# Patient Record
Sex: FEMALE | Race: WHITE | NOT HISPANIC OR LATINO | Employment: OTHER | URBAN - NONMETROPOLITAN AREA
[De-identification: names, ages, dates, MRNs, and addresses within clinical notes are randomized per-mention and may not be internally consistent; named-entity substitution may affect disease eponyms.]

---

## 2018-09-13 ENCOUNTER — OFFICE VISIT (OUTPATIENT)
Dept: FAMILY MEDICINE | Facility: OTHER | Age: 73
End: 2018-09-13
Attending: NURSE PRACTITIONER
Payer: MEDICARE

## 2018-09-13 VITALS
SYSTOLIC BLOOD PRESSURE: 142 MMHG | BODY MASS INDEX: 28.89 KG/M2 | TEMPERATURE: 96.5 F | HEART RATE: 84 BPM | WEIGHT: 169.25 LBS | DIASTOLIC BLOOD PRESSURE: 84 MMHG | HEIGHT: 64 IN

## 2018-09-13 DIAGNOSIS — R39.89 URINARY PROBLEM: ICD-10-CM

## 2018-09-13 DIAGNOSIS — N39.0 ACUTE URINARY TRACT INFECTION: Primary | ICD-10-CM

## 2018-09-13 LAB
ALBUMIN UR-MCNC: NEGATIVE MG/DL
AMORPH CRY #/AREA URNS HPF: ABNORMAL /HPF
ANION GAP SERPL CALCULATED.3IONS-SCNC: 6 MMOL/L (ref 3–14)
APPEARANCE UR: ABNORMAL
BACTERIA #/AREA URNS HPF: ABNORMAL /HPF
BASOPHILS # BLD AUTO: 0.1 10E9/L (ref 0–0.2)
BASOPHILS NFR BLD AUTO: 0.7 %
BILIRUB UR QL STRIP: NEGATIVE
BUN SERPL-MCNC: 15 MG/DL (ref 7–25)
CALCIUM SERPL-MCNC: 10 MG/DL (ref 8.6–10.3)
CHLORIDE SERPL-SCNC: 107 MMOL/L (ref 98–107)
CO2 SERPL-SCNC: 28 MMOL/L (ref 21–31)
COLOR UR AUTO: YELLOW
CREAT SERPL-MCNC: 1.03 MG/DL (ref 0.6–1.2)
DIFFERENTIAL METHOD BLD: ABNORMAL
EOSINOPHIL # BLD AUTO: 0.2 10E9/L (ref 0–0.7)
EOSINOPHIL NFR BLD AUTO: 3 %
ERYTHROCYTE [DISTWIDTH] IN BLOOD BY AUTOMATED COUNT: 12.7 % (ref 10–15)
GFR SERPL CREATININE-BSD FRML MDRD: 52 ML/MIN/1.7M2
GLUCOSE SERPL-MCNC: 107 MG/DL (ref 70–105)
GLUCOSE UR STRIP-MCNC: NEGATIVE MG/DL
HCT VFR BLD AUTO: 46.1 % (ref 35–47)
HGB BLD-MCNC: 16.3 G/DL (ref 11.7–15.7)
HGB UR QL STRIP: ABNORMAL
IMM GRANULOCYTES # BLD: 0 10E9/L (ref 0–0.4)
IMM GRANULOCYTES NFR BLD: 0.4 %
KETONES UR STRIP-MCNC: NEGATIVE MG/DL
LEUKOCYTE ESTERASE UR QL STRIP: ABNORMAL
LYMPHOCYTES # BLD AUTO: 2.2 10E9/L (ref 0.8–5.3)
LYMPHOCYTES NFR BLD AUTO: 29.3 %
MCH RBC QN AUTO: 31.3 PG (ref 26.5–33)
MCHC RBC AUTO-ENTMCNC: 35.4 G/DL (ref 31.5–36.5)
MCV RBC AUTO: 89 FL (ref 78–100)
MONOCYTES # BLD AUTO: 0.5 10E9/L (ref 0–1.3)
MONOCYTES NFR BLD AUTO: 6.1 %
NEUTROPHILS # BLD AUTO: 4.5 10E9/L (ref 1.6–8.3)
NEUTROPHILS NFR BLD AUTO: 60.5 %
NITRATE UR QL: NEGATIVE
NON-SQ EPI CELLS #/AREA URNS LPF: ABNORMAL /LPF
PH UR STRIP: 8 PH (ref 5–9)
PLATELET # BLD AUTO: 259 10E9/L (ref 150–450)
POTASSIUM SERPL-SCNC: 3.9 MMOL/L (ref 3.5–5.1)
RBC # BLD AUTO: 5.21 10E12/L (ref 3.8–5.2)
RBC #/AREA URNS AUTO: ABNORMAL /HPF
SODIUM SERPL-SCNC: 141 MMOL/L (ref 134–144)
SOURCE: ABNORMAL
SP GR UR STRIP: <1.005 (ref 1–1.03)
UROBILINOGEN UR STRIP-ACNC: 0.2 EU/DL (ref 0.2–1)
WBC # BLD AUTO: 7.4 10E9/L (ref 4–11)
WBC #/AREA URNS AUTO: ABNORMAL /HPF

## 2018-09-13 PROCEDURE — 99203 OFFICE O/P NEW LOW 30 MIN: CPT | Performed by: NURSE PRACTITIONER

## 2018-09-13 PROCEDURE — 87086 URINE CULTURE/COLONY COUNT: CPT | Performed by: NURSE PRACTITIONER

## 2018-09-13 PROCEDURE — 85025 COMPLETE CBC W/AUTO DIFF WBC: CPT | Performed by: NURSE PRACTITIONER

## 2018-09-13 PROCEDURE — G0463 HOSPITAL OUTPT CLINIC VISIT: HCPCS

## 2018-09-13 PROCEDURE — 81001 URINALYSIS AUTO W/SCOPE: CPT | Performed by: NURSE PRACTITIONER

## 2018-09-13 PROCEDURE — 80048 BASIC METABOLIC PNL TOTAL CA: CPT | Performed by: NURSE PRACTITIONER

## 2018-09-13 PROCEDURE — 36415 COLL VENOUS BLD VENIPUNCTURE: CPT | Performed by: NURSE PRACTITIONER

## 2018-09-13 PROCEDURE — 87077 CULTURE AEROBIC IDENTIFY: CPT | Performed by: NURSE PRACTITIONER

## 2018-09-13 RX ORDER — CIPROFLOXACIN 250 MG/1
250 TABLET, FILM COATED ORAL 2 TIMES DAILY
Qty: 10 TABLET | Refills: 0 | Status: SHIPPED | OUTPATIENT
Start: 2018-09-13 | End: 2018-09-18

## 2018-09-13 NOTE — NURSING NOTE
Patient presents to the clinic for dark colored urine, urinary urgency and urinary frequency that started yesterday.  Ruth CORDOVA CMA...9/13/2018 10:46 AM

## 2018-09-13 NOTE — PATIENT INSTRUCTIONS
Additive to medications - Stearate     Urine indicative of UTI    Urine culture pending    Blood work did not indicate a kidney infection    Ciprofloxacin twice daily x 5 days    Follow up if persisting, worsening or concerns

## 2018-09-13 NOTE — PROGRESS NOTES
"HPI:    Anna Neal is a 73 year old female  who presents to clinic today for UTI symptoms.    Dark brown urine, frequency, urgency, decreased output, pressure and dysuria.   States symptoms started during the night.  Mid back pain yesterday, not as bad today.  No fevers or chills.  No nausea or vomiting.  Appetite decreased some yesterday and today.  Generalized abdominal discomfort.  Increased \"stomach noise\".  States history of constipation when traveling, currently feeling constipated, last bowel movement a few days ago.  No change in stool appearance, no blood in stool,  no diarrhea.    No vaginal itching, discharge, or bleeding.  Uses Estradiol vaginal cream twice weekly.    States previous hospitalization for pyelonephritis about 6 months ago.    Used heat to back last night which helped.    Patient states she is not currently taking any prescription medications other than Estradiol cream.      Past Medical History:   Diagnosis Date     Arthritis      Cancer (H)     basil and squamous skin cancer.  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      Restless leg syndrome      Past Surgical History:   Procedure Laterality Date     APPENDECTOMY       BREAST SURGERY      tumor right breast removed     GYN SURGERY      hysterectomy      ORTHOPEDIC SURGERY      arthroscopy bilateral knees     SOFT TISSUE SURGERY Left     left arm sarcoma removed     Social History   Substance Use Topics     Smoking status: Light Tobacco Smoker     Types: Cigars     Smokeless tobacco: Never Used     Alcohol use No     No current outpatient prescriptions on file.     Allergies   Allergen Reactions     Aspirin Hives     Bee Venom Hives and Itching     Mesalamine Rash     Nsaids Anaphylaxis and Hives     Nuts Shortness Of Breath and Swelling     Tree nuts      Atenolol Other (See Comments)     Fatigue;  12-10;     Cetirizine Other (See Comments)     Erythromycin GI Disturbance     GI Sx     Mometasone Unknown     epistaxis     Nortriptyline Unknown     " "Paroxetine Other (See Comments)     tired     Penicillins Unknown     unknown     Sulfamethoxazole-Trimethoprim Hives     Trazodone Other (See Comments)     VERY TIRED  RLS;  JUMPED OUT OF MY SKIN;     Estradiol Itching and Rash     Latex Rash     unknown         Past medical history, past surgical history, current medications and allergies reviewed and accurate to the best of my knowledge.        ROS:  Refer to HPI    /84 (BP Location: Right arm, Patient Position: Sitting, Cuff Size: Adult Regular)  Pulse 84  Temp 96.5  F (35.8  C) (Tympanic)  Ht 5' 4\" (1.626 m)  Wt 169 lb 4 oz (76.8 kg)  BMI 29.05 kg/m2    EXAM:  General Appearance: Well appearing elderly female, non ill appearance, appropriate appearance for age. No acute distress  Respiratory: normal chest wall and respirations.  Normal effort.  Clear to auscultation bilaterally, no wheezing, crackles or rhonchi.  No increased work of breathing.  No cough appreciated.   Cardiac: RRR with no murmurs  Abdomen: soft, mild generalized tenderness over mid abdomen and lower abdomen, no masses or organomegally, no rebound tenderness or guarding, no rigidity, normal bowel sounds present  :  No suprapubic tenderness to palpation.  No CVA tenderness to palpation.    Musculoskeletal:  Normal gait.  Equal movement of bilateral upper extremities.  Equal movement of bilateral lower extremities.    Psychological: normal affect, alert and pleasant      Labs:  Results for orders placed or performed in visit on 09/13/18   Urinalysis w Reflex Microscopic If Positive   Result Value Ref Range    Color Urine Yellow     Appearance Urine Turbid     Glucose Urine Negative NEG^Negative mg/dL    Bilirubin Urine Negative NEG^Negative    Ketones Urine Negative NEG^Negative mg/dL    Specific Gravity Urine <1.005 1.000 - 1.030    Blood Urine Trace (A) NEG^Negative    pH Urine 8.0 5.0 - 9.0 pH    Protein Albumin Urine Negative NEG^Negative mg/dL    Urobilinogen Urine 0.2 0.2 - 1.0 " EU/dL    Nitrite Urine Negative NEG^Negative    Leukocyte Esterase Urine Small (A) NEG^Negative    Source Urine    Urine Microscopic   Result Value Ref Range    WBC Urine 25-50 (A) OTO5^0 - 5 /HPF    RBC Urine 5-10 (A) OTO2^O - 2 /HPF    Squamous Epithelial /LPF Urine Few FEW^Few /LPF    Bacteria Urine Many (A) NEG^Negative /HPF    Amorphous Crystals Few (A) NEG^Negative /HPF   CBC and Differential   Result Value Ref Range    WBC 7.4 4.0 - 11.0 10e9/L    RBC Count 5.21 (H) 3.8 - 5.2 10e12/L    Hemoglobin 16.3 (H) 11.7 - 15.7 g/dL    Hematocrit 46.1 35.0 - 47.0 %    MCV 89 78 - 100 fl    MCH 31.3 26.5 - 33.0 pg    MCHC 35.4 31.5 - 36.5 g/dL    RDW 12.7 10.0 - 15.0 %    Platelet Count 259 150 - 450 10e9/L    Diff Method Automated Method     % Neutrophils 60.5 %    % Lymphocytes 29.3 %    % Monocytes 6.1 %    % Eosinophils 3.0 %    % Basophils 0.7 %    % Immature Granulocytes 0.4 %    Absolute Neutrophil 4.5 1.6 - 8.3 10e9/L    Absolute Lymphocytes 2.2 0.8 - 5.3 10e9/L    Absolute Monocytes 0.5 0.0 - 1.3 10e9/L    Absolute Eosinophils 0.2 0.0 - 0.7 10e9/L    Absolute Basophils 0.1 0.0 - 0.2 10e9/L    Abs Immature Granulocytes 0.0 0 - 0.4 10e9/L   Basic Metabolic Panel   Result Value Ref Range    Sodium 141 134 - 144 mmol/L    Potassium 3.9 3.5 - 5.1 mmol/L    Chloride 107 98 - 107 mmol/L    Carbon Dioxide 28 21 - 31 mmol/L    Anion Gap 6 3 - 14 mmol/L    Glucose 107 (H) 70 - 105 mg/dL    Urea Nitrogen 15 7 - 25 mg/dL    Creatinine 1.03 0.60 - 1.20 mg/dL    GFR Estimate 52 (L) >60 mL/min/1.7m2    GFR Estimate If Black 63 >60 mL/min/1.7m2    Calcium 10.0 8.6 - 10.3 mg/dL             ASSESSMENT/PLAN:  1. Urinary problem    - Urinalysis w Reflex Microscopic If Positive  - Urine Microscopic  - CBC and Differential; Future  - Basic Metabolic Panel; Future  - CBC and Differential  - Basic Metabolic Panel  - Urine Culture Aerobic Bacterial    2. Acute urinary tract infection      Urinalysis - moderate WBCs, few RBCs, many  bacteria, negative nitrite    Urine culture pending    CBC - normal     BMP - no concerns, normal creatinine, minimally decreased GFR of 52    - ciprofloxacin (CIPRO) 250 MG tablet; Take 1 tablet (250 mg) by mouth 2 times daily for 5 days  Dispense: 10 tablet; Refill: 0  Unable to use Bactrim due to allergy.  Unable to use Macrobid due to low GFR.      Encouraged fluids and frequent bladder emptying.    May use Pyridium OTC PRN.      Will call if culture warrants change of abx.     Discussed warning signs/symptoms indicative of need to f/u    Follow up if symptoms persist or worsen or concerns

## 2018-09-13 NOTE — MR AVS SNAPSHOT
"              After Visit Summary   9/13/2018    Anna Neal    MRN: [id number]           Patient Information     Date Of Birth          1945        Visit Information        Provider Department      9/13/2018 11:00 AM Marcela Cash NP [facility]        Today's Diagnoses     Urinary problem    -  1    Acute urinary tract infection          Care Instructions    Additive to medications - Stearate     Urine indicative of UTI    Urine culture pending    Blood work did not indicate a kidney infection    Ciprofloxacin twice daily x 5 days    Follow up if persisting, worsening or concerns          Follow-ups after your visit        Who to contact     If you have questions or need follow up information about today's clinic visit or your schedule please contact [facility] directly at [phone].  Normal or non-critical lab and imaging results will be communicated to you by BriefMehart, letter or phone within 4 business days after the clinic has received the results. If you do not hear from us within 7 days, please contact the clinic through BriefMehart or phone. If you have a critical or abnormal lab result, we will notify you by phone as soon as possible.  Submit refill requests through Cheezburger or call your pharmacy and they will forward the refill request to us. Please allow 3 business days for your refill to be completed.          Additional Information About Your Visit        BriefMeharLeanplum Information     Cheezburger lets you send messages to your doctor, view your test results, renew your prescriptions, schedule appointments and more. To sign up, go to [email or web address] . Click on \"Log in\" on the left side of the screen, which will take you to the Welcome page. Then click on \"Sign up Now\" on the right side of the page.     You will be asked to enter the access code listed below, as well as some personal information. Please follow the directions to create your username " "and password.     Your access code is: [email or web address]  Expires: 2018 12:28 PM     Your access code will  in 90 days. If you need help or a new code, please call your [facility] clinic or [phone].        Care EveryWhere ID     This is your Care EveryWhere ID. This could be used by other organizations to access your [facility] medical records  [id number]        Your Vitals Were     Pulse Temperature Height BMI (Body Mass Index)          84 96.5  F (35.8  C) (Tympanic) 5' 4\" (1.626 m) 29.05 kg/m2         Blood Pressure from Last 3 Encounters:   18 142/84    Weight from Last 3 Encounters:   18 169 lb 4 oz (76.8 kg)              We Performed the Following     Basic Metabolic Panel     CBC and Differential     Urinalysis w Reflex Microscopic If Positive     Urine Culture Aerobic Bacterial     Urine Microscopic          Today's Medication Changes          These changes are accurate as of 18 12:28 PM.  If you have any questions, ask your nurse or doctor.               Start taking these medicines.        Dose/Directions    ciprofloxacin 250 MG tablet   Commonly known as:  CIPRO   Used for:  Acute urinary tract infection   Started by:  Marcela Cash NP        Dose:  250 mg   Take 1 tablet (250 mg) by mouth 2 times daily for 5 days   Quantity:  10 tablet   Refills:  0            Where to get your medicines      [facility] [address] [place], [place] - [address]  [address], [facility] [address]     Phone:  [phone]     ciprofloxacin 250 MG tablet                Primary Care Provider Fax #    Physician No Ref-Primary [phone]       No address on file        Equal Access to Services     BRANDON DANG : Danny Chavez, waemily dior, qaybta kaalannemarie baum. So [facility] [phone].    ATENCIÓN: Si habla español, tiene a leija disposición servicios gratuitos " de asistencia lingüística. Onesimo [place] [phone].    We comply with applicable federal civil rights laws and [place] laws. We do not discriminate on the basis of race, color, national origin, age, disability, sex, sexual orientation, or gender identity.            Thank you!     Thank you for choosing [facility]  for your care. Our goal is always to provide you with excellent care. Hearing back from our patients is one way we can continue to improve our services. Please take a few minutes to complete the written survey that you may receive in the mail after your visit with us. Thank you!             Your Updated Medication List - Protect others around you: Learn how to safely use, store and throw away your medicines at www.disposemymeds.org.          This list is accurate as of 9/13/18 12:28 PM.  Always use your most recent med list.                   Brand Name Dispense Instructions for use Diagnosis    ciprofloxacin 250 MG tablet    CIPRO    10 tablet    Take 1 tablet (250 mg) by mouth 2 times daily for 5 days    Acute urinary tract infection

## 2018-09-16 LAB
BACTERIA SPEC CULT: ABNORMAL
SPECIMEN SOURCE: ABNORMAL

## 2019-02-26 ENCOUNTER — RECORDS - HEALTHEAST (OUTPATIENT)
Dept: ADMINISTRATIVE | Facility: OTHER | Age: 74
End: 2019-02-26

## 2019-02-26 LAB — HBA1C MFR BLD: 7 % (ref 0–5.6)

## 2019-02-27 ENCOUNTER — RECORDS - HEALTHEAST (OUTPATIENT)
Dept: HEALTH INFORMATION MANAGEMENT | Facility: CLINIC | Age: 74
End: 2019-02-27

## 2019-03-15 ASSESSMENT — MIFFLIN-ST. JEOR: SCORE: 1231.58

## 2019-03-18 ENCOUNTER — ANESTHESIA - HEALTHEAST (OUTPATIENT)
Dept: SURGERY | Facility: CLINIC | Age: 74
End: 2019-03-18

## 2019-03-19 ENCOUNTER — DOCUMENTATION ONLY (OUTPATIENT)
Dept: OTHER | Facility: CLINIC | Age: 74
End: 2019-03-19

## 2019-03-19 ENCOUNTER — AMBULATORY - HEALTHEAST (OUTPATIENT)
Dept: OTHER | Facility: CLINIC | Age: 74
End: 2019-03-19

## 2019-03-19 ENCOUNTER — SURGERY - HEALTHEAST (OUTPATIENT)
Dept: SURGERY | Facility: CLINIC | Age: 74
End: 2019-03-19

## 2019-03-19 ASSESSMENT — MIFFLIN-ST. JEOR: SCORE: 1224.32

## 2019-03-25 ENCOUNTER — OFFICE VISIT - HEALTHEAST (OUTPATIENT)
Dept: GERIATRICS | Facility: CLINIC | Age: 74
End: 2019-03-25

## 2019-03-25 ENCOUNTER — RECORDS - HEALTHEAST (OUTPATIENT)
Dept: LAB | Facility: CLINIC | Age: 74
End: 2019-03-25

## 2019-03-25 DIAGNOSIS — Z96.652 HX OF TOTAL KNEE REPLACEMENT, LEFT: ICD-10-CM

## 2019-03-25 DIAGNOSIS — M17.10 OSTEOARTHRITIS, LOCALIZED, KNEE: ICD-10-CM

## 2019-03-25 DIAGNOSIS — R53.1 WEAKNESS: ICD-10-CM

## 2019-03-26 ENCOUNTER — OFFICE VISIT - HEALTHEAST (OUTPATIENT)
Dept: GERIATRICS | Facility: CLINIC | Age: 74
End: 2019-03-26

## 2019-03-26 ENCOUNTER — COMMUNICATION - HEALTHEAST (OUTPATIENT)
Dept: GERIATRICS | Facility: CLINIC | Age: 74
End: 2019-03-26

## 2019-03-26 DIAGNOSIS — R53.1 WEAKNESS: ICD-10-CM

## 2019-03-26 DIAGNOSIS — Z96.652 HX OF TOTAL KNEE REPLACEMENT, LEFT: ICD-10-CM

## 2019-03-26 DIAGNOSIS — M79.89 LEFT LEG SWELLING: ICD-10-CM

## 2019-03-26 DIAGNOSIS — R26.81 UNSTEADY GAIT: ICD-10-CM

## 2019-03-26 LAB
ANION GAP SERPL CALCULATED.3IONS-SCNC: 7 MMOL/L (ref 5–18)
BUN SERPL-MCNC: 16 MG/DL (ref 8–28)
CALCIUM SERPL-MCNC: 9.9 MG/DL (ref 8.5–10.5)
CHLORIDE BLD-SCNC: 104 MMOL/L (ref 98–107)
CO2 SERPL-SCNC: 29 MMOL/L (ref 22–31)
CREAT SERPL-MCNC: 0.89 MG/DL (ref 0.6–1.1)
ERYTHROCYTE [DISTWIDTH] IN BLOOD BY AUTOMATED COUNT: 12.5 % (ref 11–14.5)
GFR SERPL CREATININE-BSD FRML MDRD: >60 ML/MIN/1.73M2
GLUCOSE BLD-MCNC: 139 MG/DL (ref 70–125)
HCT VFR BLD AUTO: 40.5 % (ref 35–47)
HGB BLD-MCNC: 13.7 G/DL (ref 12–16)
MAGNESIUM SERPL-MCNC: 1.9 MG/DL (ref 1.8–2.6)
MCH RBC QN AUTO: 31.1 PG (ref 27–34)
MCHC RBC AUTO-ENTMCNC: 33.8 G/DL (ref 32–36)
MCV RBC AUTO: 92 FL (ref 80–100)
PLATELET # BLD AUTO: 292 THOU/UL (ref 140–440)
PMV BLD AUTO: 9.4 FL (ref 8.5–12.5)
POTASSIUM BLD-SCNC: 3.8 MMOL/L (ref 3.5–5)
RBC # BLD AUTO: 4.41 MILL/UL (ref 3.8–5.4)
SODIUM SERPL-SCNC: 140 MMOL/L (ref 136–145)
WBC: 5.9 THOU/UL (ref 4–11)

## 2019-03-28 ENCOUNTER — OFFICE VISIT - HEALTHEAST (OUTPATIENT)
Dept: GERIATRICS | Facility: CLINIC | Age: 74
End: 2019-03-28

## 2019-03-28 DIAGNOSIS — Z96.652 HX OF TOTAL KNEE REPLACEMENT, LEFT: ICD-10-CM

## 2019-03-28 DIAGNOSIS — M17.12 PRIMARY OSTEOARTHRITIS OF LEFT KNEE: ICD-10-CM

## 2019-03-29 ENCOUNTER — OFFICE VISIT - HEALTHEAST (OUTPATIENT)
Dept: GERIATRICS | Facility: CLINIC | Age: 74
End: 2019-03-29

## 2019-03-29 DIAGNOSIS — Z96.652 HX OF TOTAL KNEE REPLACEMENT, LEFT: ICD-10-CM

## 2019-03-29 DIAGNOSIS — M17.12 PRIMARY OSTEOARTHRITIS OF LEFT KNEE: ICD-10-CM

## 2019-04-01 ENCOUNTER — AMBULATORY - HEALTHEAST (OUTPATIENT)
Dept: GERIATRICS | Facility: CLINIC | Age: 74
End: 2019-04-01

## 2019-04-03 ENCOUNTER — AMBULATORY - HEALTHEAST (OUTPATIENT)
Dept: ADMINISTRATIVE | Facility: REHABILITATION | Age: 74
End: 2019-04-03

## 2019-04-03 DIAGNOSIS — Z47.1 AFTERCARE FOLLOWING JOINT REPLACEMENT: ICD-10-CM

## 2019-04-04 ENCOUNTER — OFFICE VISIT - HEALTHEAST (OUTPATIENT)
Dept: PHYSICAL THERAPY | Facility: REHABILITATION | Age: 74
End: 2019-04-04

## 2019-04-04 DIAGNOSIS — M25.662 DECREASED ROM OF LEFT KNEE: ICD-10-CM

## 2019-04-04 DIAGNOSIS — R26.89 ANTALGIC GAIT: ICD-10-CM

## 2019-04-04 DIAGNOSIS — M62.81 GENERALIZED MUSCLE WEAKNESS: ICD-10-CM

## 2019-04-04 DIAGNOSIS — G89.18 ACUTE POSTOPERATIVE PAIN OF LEFT KNEE: ICD-10-CM

## 2019-04-04 DIAGNOSIS — M25.562 ACUTE POSTOPERATIVE PAIN OF LEFT KNEE: ICD-10-CM

## 2019-04-09 ENCOUNTER — OFFICE VISIT - HEALTHEAST (OUTPATIENT)
Dept: PHYSICAL THERAPY | Facility: REHABILITATION | Age: 74
End: 2019-04-09

## 2019-04-09 DIAGNOSIS — R26.89 ANTALGIC GAIT: ICD-10-CM

## 2019-04-09 DIAGNOSIS — M62.81 GENERALIZED MUSCLE WEAKNESS: ICD-10-CM

## 2019-04-09 DIAGNOSIS — M25.662 DECREASED ROM OF LEFT KNEE: ICD-10-CM

## 2019-04-16 ENCOUNTER — OFFICE VISIT - HEALTHEAST (OUTPATIENT)
Dept: PHYSICAL THERAPY | Facility: REHABILITATION | Age: 74
End: 2019-04-16

## 2019-04-16 DIAGNOSIS — M62.81 GENERALIZED MUSCLE WEAKNESS: ICD-10-CM

## 2019-04-16 DIAGNOSIS — R26.89 ANTALGIC GAIT: ICD-10-CM

## 2019-04-16 DIAGNOSIS — M25.662 DECREASED ROM OF LEFT KNEE: ICD-10-CM

## 2019-04-16 DIAGNOSIS — M25.562 ACUTE POSTOPERATIVE PAIN OF LEFT KNEE: ICD-10-CM

## 2019-04-16 DIAGNOSIS — G89.18 ACUTE POSTOPERATIVE PAIN OF LEFT KNEE: ICD-10-CM

## 2019-04-18 ENCOUNTER — OFFICE VISIT - HEALTHEAST (OUTPATIENT)
Dept: PHYSICAL THERAPY | Facility: REHABILITATION | Age: 74
End: 2019-04-18

## 2019-04-18 DIAGNOSIS — R26.89 ANTALGIC GAIT: ICD-10-CM

## 2019-04-18 DIAGNOSIS — M25.662 DECREASED ROM OF LEFT KNEE: ICD-10-CM

## 2019-04-18 DIAGNOSIS — M25.562 ACUTE POSTOPERATIVE PAIN OF LEFT KNEE: ICD-10-CM

## 2019-04-18 DIAGNOSIS — G89.18 ACUTE POSTOPERATIVE PAIN OF LEFT KNEE: ICD-10-CM

## 2019-04-18 DIAGNOSIS — M62.81 GENERALIZED MUSCLE WEAKNESS: ICD-10-CM

## 2019-04-25 ENCOUNTER — OFFICE VISIT - HEALTHEAST (OUTPATIENT)
Dept: PHYSICAL THERAPY | Facility: REHABILITATION | Age: 74
End: 2019-04-25

## 2019-04-25 DIAGNOSIS — M25.562 ACUTE POSTOPERATIVE PAIN OF LEFT KNEE: ICD-10-CM

## 2019-04-25 DIAGNOSIS — M25.662 DECREASED ROM OF LEFT KNEE: ICD-10-CM

## 2019-04-25 DIAGNOSIS — G89.18 ACUTE POSTOPERATIVE PAIN OF LEFT KNEE: ICD-10-CM

## 2019-04-25 DIAGNOSIS — R26.89 ANTALGIC GAIT: ICD-10-CM

## 2019-04-25 DIAGNOSIS — M62.81 GENERALIZED MUSCLE WEAKNESS: ICD-10-CM

## 2019-04-30 ENCOUNTER — OFFICE VISIT - HEALTHEAST (OUTPATIENT)
Dept: PHYSICAL THERAPY | Facility: REHABILITATION | Age: 74
End: 2019-04-30

## 2019-04-30 DIAGNOSIS — G89.18 ACUTE POSTOPERATIVE PAIN OF LEFT KNEE: ICD-10-CM

## 2019-04-30 DIAGNOSIS — M25.662 DECREASED ROM OF LEFT KNEE: ICD-10-CM

## 2019-04-30 DIAGNOSIS — M25.562 ACUTE POSTOPERATIVE PAIN OF LEFT KNEE: ICD-10-CM

## 2019-04-30 DIAGNOSIS — M62.81 GENERALIZED MUSCLE WEAKNESS: ICD-10-CM

## 2019-04-30 DIAGNOSIS — R26.89 ANTALGIC GAIT: ICD-10-CM

## 2019-05-09 ENCOUNTER — OFFICE VISIT - HEALTHEAST (OUTPATIENT)
Dept: PHYSICAL THERAPY | Facility: REHABILITATION | Age: 74
End: 2019-05-09

## 2019-05-09 DIAGNOSIS — R26.89 ANTALGIC GAIT: ICD-10-CM

## 2019-05-09 DIAGNOSIS — G89.18 ACUTE POSTOPERATIVE PAIN OF LEFT KNEE: ICD-10-CM

## 2019-05-09 DIAGNOSIS — M25.562 ACUTE POSTOPERATIVE PAIN OF LEFT KNEE: ICD-10-CM

## 2019-05-09 DIAGNOSIS — M62.81 GENERALIZED MUSCLE WEAKNESS: ICD-10-CM

## 2019-05-09 DIAGNOSIS — M25.662 DECREASED ROM OF LEFT KNEE: ICD-10-CM

## 2019-05-14 ENCOUNTER — OFFICE VISIT - HEALTHEAST (OUTPATIENT)
Dept: PHYSICAL THERAPY | Facility: REHABILITATION | Age: 74
End: 2019-05-14

## 2019-05-14 DIAGNOSIS — M62.81 GENERALIZED MUSCLE WEAKNESS: ICD-10-CM

## 2019-05-14 DIAGNOSIS — R26.89 ANTALGIC GAIT: ICD-10-CM

## 2019-05-14 DIAGNOSIS — G89.18 ACUTE POSTOPERATIVE PAIN OF LEFT KNEE: ICD-10-CM

## 2019-05-14 DIAGNOSIS — M25.662 DECREASED ROM OF LEFT KNEE: ICD-10-CM

## 2019-05-14 DIAGNOSIS — M25.562 ACUTE POSTOPERATIVE PAIN OF LEFT KNEE: ICD-10-CM

## 2019-05-16 ENCOUNTER — OFFICE VISIT - HEALTHEAST (OUTPATIENT)
Dept: PHYSICAL THERAPY | Facility: REHABILITATION | Age: 74
End: 2019-05-16

## 2019-05-16 DIAGNOSIS — M25.562 ACUTE POSTOPERATIVE PAIN OF LEFT KNEE: ICD-10-CM

## 2019-05-16 DIAGNOSIS — G89.18 ACUTE POSTOPERATIVE PAIN OF LEFT KNEE: ICD-10-CM

## 2019-05-16 DIAGNOSIS — M25.662 DECREASED ROM OF LEFT KNEE: ICD-10-CM

## 2019-05-16 DIAGNOSIS — R26.89 ANTALGIC GAIT: ICD-10-CM

## 2019-05-16 DIAGNOSIS — M62.81 GENERALIZED MUSCLE WEAKNESS: ICD-10-CM

## 2019-05-20 ENCOUNTER — RECORDS - HEALTHEAST (OUTPATIENT)
Dept: ADMINISTRATIVE | Facility: OTHER | Age: 74
End: 2019-05-20

## 2019-06-03 ENCOUNTER — RECORDS - HEALTHEAST (OUTPATIENT)
Dept: ADMINISTRATIVE | Facility: OTHER | Age: 74
End: 2019-06-03

## 2019-06-06 ENCOUNTER — RECORDS - HEALTHEAST (OUTPATIENT)
Dept: ADMINISTRATIVE | Facility: OTHER | Age: 74
End: 2019-06-06

## 2019-06-07 ENCOUNTER — HOSPITAL ENCOUNTER (OUTPATIENT)
Dept: MRI IMAGING | Facility: CLINIC | Age: 74
Discharge: HOME | End: 2019-06-07
Attending: PSYCHIATRY & NEUROLOGY

## 2019-06-07 DIAGNOSIS — I63.81 LACUNAR INFARCTION (H): ICD-10-CM

## 2019-06-07 DIAGNOSIS — M54.9 CHRONIC BACK PAIN: ICD-10-CM

## 2019-06-07 DIAGNOSIS — G89.29 CHRONIC BACK PAIN: ICD-10-CM

## 2019-06-07 LAB
CREAT BLD-MCNC: 1 MG/DL (ref 0.6–1.1)
GFR SERPL CREATININE-BSD FRML MDRD: 54 ML/MIN/1.73M2

## 2019-06-13 ENCOUNTER — OFFICE VISIT - HEALTHEAST (OUTPATIENT)
Dept: PHYSICAL THERAPY | Facility: REHABILITATION | Age: 74
End: 2019-06-13

## 2019-06-13 DIAGNOSIS — M25.662 DECREASED ROM OF LEFT KNEE: ICD-10-CM

## 2019-06-13 DIAGNOSIS — M62.81 GENERALIZED MUSCLE WEAKNESS: ICD-10-CM

## 2019-06-13 DIAGNOSIS — G89.18 ACUTE POSTOPERATIVE PAIN OF LEFT KNEE: ICD-10-CM

## 2019-06-13 DIAGNOSIS — R26.89 ANTALGIC GAIT: ICD-10-CM

## 2019-06-13 DIAGNOSIS — M25.562 ACUTE POSTOPERATIVE PAIN OF LEFT KNEE: ICD-10-CM

## 2019-06-14 ENCOUNTER — HOSPITAL ENCOUNTER (OUTPATIENT)
Dept: CARDIOLOGY | Facility: HOSPITAL | Age: 74
Discharge: HOME | End: 2019-06-14
Attending: PSYCHIATRY & NEUROLOGY

## 2019-06-14 DIAGNOSIS — I63.81 LACUNAR INFARCTION (H): ICD-10-CM

## 2019-06-14 DIAGNOSIS — G89.29 CHRONIC BACK PAIN: ICD-10-CM

## 2019-06-14 DIAGNOSIS — M54.9 CHRONIC BACK PAIN: ICD-10-CM

## 2019-06-14 LAB
AORTIC ROOT: 3.6 CM
BSA FOR ECHO PROCEDURE: 1.82 M2
CV BLOOD PRESSURE: ABNORMAL MMHG
CV ECHO HEIGHT: 65 IN
CV ECHO WEIGHT: 160 LBS
DOP CALC LVOT AREA: 3.46 CM2
DOP CALC LVOT DIAMETER: 2.1 CM
DOP CALC LVOT STROKE VOLUME: 61.6 CM3
DOP CALCLVOT PEAK VEL VTI: 17.8 CM
EJECTION FRACTION: 62 % (ref 55–75)
FRACTIONAL SHORTENING: 39.5 % (ref 28–44)
INTERVENTRICULAR SEPTUM IN END DIASTOLE: 1 CM (ref 0.6–0.9)
IVS/PW RATIO: 0.8
LA AREA 1: 15.9 CM2
LA AREA 2: 15.9 CM2
LEFT ATRIUM LENGTH: 4.65 CM
LEFT ATRIUM SIZE: 3.3 CM
LEFT ATRIUM TO AORTIC ROOT RATIO: 0.92 NO UNITS
LEFT ATRIUM VOLUME INDEX: 25.4 ML/M2
LEFT ATRIUM VOLUME: 46.2 ML
LEFT VENTRICLE CARDIAC INDEX: 2.9 L/MIN/M2
LEFT VENTRICLE CARDIAC OUTPUT: 5.4 L/MIN
LEFT VENTRICLE DIASTOLIC VOLUME INDEX: 41.8 CM3/M2 (ref 29–61)
LEFT VENTRICLE DIASTOLIC VOLUME: 76 CM3 (ref 46–106)
LEFT VENTRICLE HEART RATE: 87 BPM
LEFT VENTRICLE MASS INDEX: 89.5 G/M2
LEFT VENTRICLE SYSTOLIC VOLUME INDEX: 15.9 CM3/M2 (ref 8–24)
LEFT VENTRICLE SYSTOLIC VOLUME: 29 CM3 (ref 14–42)
LEFT VENTRICULAR INTERNAL DIMENSION IN DIASTOLE: 4.3 CM (ref 3.8–5.2)
LEFT VENTRICULAR INTERNAL DIMENSION IN SYSTOLE: 2.6 CM (ref 2.2–3.5)
LEFT VENTRICULAR MASS: 162.9 G
LEFT VENTRICULAR OUTFLOW TRACT MEAN GRADIENT: 2 MMHG
LEFT VENTRICULAR OUTFLOW TRACT MEAN VELOCITY: 60.9 CM/S
LEFT VENTRICULAR POSTERIOR WALL IN END DIASTOLE: 1.2 CM (ref 0.6–0.9)
LV STROKE VOLUME INDEX: 33.9 ML/M2
MITRAL VALVE E/A RATIO: 0.7
MV AVERAGE E/E' RATIO: 16 CM/S
MV DECELERATION TIME: 165 MS
MV E'TISSUE VEL-LAT: 4.09 CM/S
MV E'TISSUE VEL-MED: 4.19 CM/S
MV LATERAL E/E' RATIO: 16.2
MV MEDIAL E/E' RATIO: 15.8
MV PEAK A VELOCITY: 92.3 CM/S
MV PEAK E VELOCITY: 66.1 CM/S
NUC REST DIASTOLIC VOLUME INDEX: 2560 LBS
NUC REST SYSTOLIC VOLUME INDEX: 65 IN
PR MAX PG: 2 MMHG
PR PEAK VELOCITY: 77.4 CM/S
TRICUSPID REGURGITATION PEAK PRESSURE GRADIENT: 23.4 MMHG
TRICUSPID VALVE ANULAR PLANE SYSTOLIC EXCURSION: 2.9 CM
TRICUSPID VALVE PEAK REGURGITANT VELOCITY: 242 CM/S

## 2019-06-14 ASSESSMENT — MIFFLIN-ST. JEOR: SCORE: 1206.64

## 2019-06-17 ENCOUNTER — OFFICE VISIT - HEALTHEAST (OUTPATIENT)
Dept: PHYSICAL THERAPY | Facility: REHABILITATION | Age: 74
End: 2019-06-17

## 2019-06-17 DIAGNOSIS — G89.18 ACUTE POSTOPERATIVE PAIN OF LEFT KNEE: ICD-10-CM

## 2019-06-17 DIAGNOSIS — M25.562 ACUTE POSTOPERATIVE PAIN OF LEFT KNEE: ICD-10-CM

## 2019-06-17 DIAGNOSIS — M25.662 DECREASED ROM OF LEFT KNEE: ICD-10-CM

## 2019-06-17 DIAGNOSIS — R26.89 ANTALGIC GAIT: ICD-10-CM

## 2019-06-17 DIAGNOSIS — M62.81 GENERALIZED MUSCLE WEAKNESS: ICD-10-CM

## 2019-06-19 ENCOUNTER — AMBULATORY - HEALTHEAST (OUTPATIENT)
Dept: ADMINISTRATIVE | Facility: REHABILITATION | Age: 74
End: 2019-06-19

## 2019-06-19 DIAGNOSIS — G25.5 HEMIBALLISMUS: ICD-10-CM

## 2019-06-19 DIAGNOSIS — M54.16 LUMBAR RADICULOPATHY: ICD-10-CM

## 2019-06-19 DIAGNOSIS — Z47.1 AFTERCARE FOLLOWING JOINT REPLACEMENT: ICD-10-CM

## 2019-06-24 ENCOUNTER — OFFICE VISIT - HEALTHEAST (OUTPATIENT)
Dept: PHYSICAL THERAPY | Facility: REHABILITATION | Age: 74
End: 2019-06-24

## 2019-06-24 DIAGNOSIS — R26.89 ANTALGIC GAIT: ICD-10-CM

## 2019-06-24 DIAGNOSIS — M25.562 ACUTE POSTOPERATIVE PAIN OF LEFT KNEE: ICD-10-CM

## 2019-06-24 DIAGNOSIS — M62.81 GENERALIZED MUSCLE WEAKNESS: ICD-10-CM

## 2019-06-24 DIAGNOSIS — M25.662 DECREASED ROM OF LEFT KNEE: ICD-10-CM

## 2019-06-24 DIAGNOSIS — G89.18 ACUTE POSTOPERATIVE PAIN OF LEFT KNEE: ICD-10-CM

## 2019-06-27 ENCOUNTER — OFFICE VISIT - HEALTHEAST (OUTPATIENT)
Dept: PHYSICAL THERAPY | Facility: REHABILITATION | Age: 74
End: 2019-06-27

## 2019-06-27 DIAGNOSIS — M62.81 GENERALIZED MUSCLE WEAKNESS: ICD-10-CM

## 2019-06-27 DIAGNOSIS — M25.662 DECREASED ROM OF LEFT KNEE: ICD-10-CM

## 2019-06-27 DIAGNOSIS — G89.18 ACUTE POSTOPERATIVE PAIN OF LEFT KNEE: ICD-10-CM

## 2019-06-27 DIAGNOSIS — M25.562 ACUTE POSTOPERATIVE PAIN OF LEFT KNEE: ICD-10-CM

## 2019-06-27 DIAGNOSIS — R26.89 ANTALGIC GAIT: ICD-10-CM

## 2019-07-02 ENCOUNTER — OFFICE VISIT - HEALTHEAST (OUTPATIENT)
Dept: PHYSICAL THERAPY | Facility: REHABILITATION | Age: 74
End: 2019-07-02

## 2019-07-02 DIAGNOSIS — M62.81 GENERALIZED MUSCLE WEAKNESS: ICD-10-CM

## 2019-07-02 DIAGNOSIS — M25.562 ACUTE POSTOPERATIVE PAIN OF LEFT KNEE: ICD-10-CM

## 2019-07-02 DIAGNOSIS — M25.662 DECREASED ROM OF LEFT KNEE: ICD-10-CM

## 2019-07-02 DIAGNOSIS — R26.89 ANTALGIC GAIT: ICD-10-CM

## 2019-07-02 DIAGNOSIS — G89.18 ACUTE POSTOPERATIVE PAIN OF LEFT KNEE: ICD-10-CM

## 2019-07-11 ENCOUNTER — OFFICE VISIT - HEALTHEAST (OUTPATIENT)
Dept: PHYSICAL THERAPY | Facility: REHABILITATION | Age: 74
End: 2019-07-11

## 2019-07-11 DIAGNOSIS — M62.81 GENERALIZED MUSCLE WEAKNESS: ICD-10-CM

## 2019-07-11 DIAGNOSIS — G89.18 ACUTE POSTOPERATIVE PAIN OF LEFT KNEE: ICD-10-CM

## 2019-07-11 DIAGNOSIS — M25.662 DECREASED ROM OF LEFT KNEE: ICD-10-CM

## 2019-07-11 DIAGNOSIS — R26.89 ANTALGIC GAIT: ICD-10-CM

## 2019-07-11 DIAGNOSIS — M25.562 ACUTE POSTOPERATIVE PAIN OF LEFT KNEE: ICD-10-CM

## 2019-07-18 ENCOUNTER — OFFICE VISIT - HEALTHEAST (OUTPATIENT)
Dept: PHYSICAL THERAPY | Facility: REHABILITATION | Age: 74
End: 2019-07-18

## 2019-07-18 DIAGNOSIS — M25.562 ACUTE POSTOPERATIVE PAIN OF LEFT KNEE: ICD-10-CM

## 2019-07-18 DIAGNOSIS — M62.81 GENERALIZED MUSCLE WEAKNESS: ICD-10-CM

## 2019-07-18 DIAGNOSIS — M25.662 DECREASED ROM OF LEFT KNEE: ICD-10-CM

## 2019-07-18 DIAGNOSIS — G89.18 ACUTE POSTOPERATIVE PAIN OF LEFT KNEE: ICD-10-CM

## 2019-07-18 DIAGNOSIS — R26.89 ANTALGIC GAIT: ICD-10-CM

## 2019-07-25 ENCOUNTER — OFFICE VISIT - HEALTHEAST (OUTPATIENT)
Dept: PHYSICAL THERAPY | Facility: REHABILITATION | Age: 74
End: 2019-07-25

## 2019-07-25 DIAGNOSIS — M62.81 GENERALIZED MUSCLE WEAKNESS: ICD-10-CM

## 2019-07-25 DIAGNOSIS — G89.18 ACUTE POSTOPERATIVE PAIN OF LEFT KNEE: ICD-10-CM

## 2019-07-25 DIAGNOSIS — R26.89 ANTALGIC GAIT: ICD-10-CM

## 2019-07-25 DIAGNOSIS — M25.562 ACUTE POSTOPERATIVE PAIN OF LEFT KNEE: ICD-10-CM

## 2019-07-25 DIAGNOSIS — M25.662 DECREASED ROM OF LEFT KNEE: ICD-10-CM

## 2019-08-01 ENCOUNTER — OFFICE VISIT - HEALTHEAST (OUTPATIENT)
Dept: PHYSICAL THERAPY | Facility: REHABILITATION | Age: 74
End: 2019-08-01

## 2019-08-01 DIAGNOSIS — R26.89 ANTALGIC GAIT: ICD-10-CM

## 2019-08-01 DIAGNOSIS — M62.81 GENERALIZED MUSCLE WEAKNESS: ICD-10-CM

## 2019-08-01 DIAGNOSIS — M25.662 DECREASED ROM OF LEFT KNEE: ICD-10-CM

## 2019-08-01 DIAGNOSIS — M25.562 ACUTE POSTOPERATIVE PAIN OF LEFT KNEE: ICD-10-CM

## 2019-08-01 DIAGNOSIS — G89.18 ACUTE POSTOPERATIVE PAIN OF LEFT KNEE: ICD-10-CM

## 2021-05-25 ENCOUNTER — RECORDS - HEALTHEAST (OUTPATIENT)
Dept: ADMINISTRATIVE | Facility: CLINIC | Age: 76
End: 2021-05-25

## 2021-05-27 ENCOUNTER — RECORDS - HEALTHEAST (OUTPATIENT)
Dept: ADMINISTRATIVE | Facility: CLINIC | Age: 76
End: 2021-05-27
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
## 2021-05-27 NOTE — PROGRESS NOTES
[facility] Daily Progress     Patient Name: Anna Neal  Date: 2019  Visit #:   PTA visit #:    Referral Diagnosis: left total knee replacement  Referring provider: Fady Christy MD  Visit Diagnosis:     ICD-10-CM    1. Decreased ROM of left knee M25.662    2. Antalgic gait R26.89    3. Generalized muscle weakness M62.81          Assessment:   Pt's ROM has improved since IE- she has been partially compliant with HEP. Gait pattern is improving with increased terminal knee ext and equal B with stance phase B.     Patient is benefitting from skilled physical therapy and is making steady progress toward functional goals.  Patient is appropriate to continue with skilled physical therapy intervention, as indicated by initial plan of care.    Goal Status:  Pt. will demonstrate/verbalize independence in self-management of condition in : 2 weeks  Pt. will be independent with home exercise program in : 2 weeks    Pt will: display L knee PROM extension <5 degrees and flexion >115 degrees to improve ambulation and ROM in 12 weeks  Pt will: ascend/descend stairs with reciprocal patternw with 1 HR to improve stars in her home in 12 weeks  Pt will: walk >15' on outdoor surfaces without AD without increased pain to walk in her garden in 12 weeks  Pt will: stand >15' without AD and without breaks to perform household chores: cooking, cleaning in 12 weeks      Plan / Patient Education:     Continue with initial plan of care.  Progress with home program as tolerated.    Subjective:   Patient saw the surgeon yesterday- x-ray was looking good, MD was happy with ROM. Bandages removed.   Is using medication to help her sleep. Did not perform HEP yesterday.    Pain Ratin    Objective:     Improved ambulation with walker, improved terminal knee ext since last session    L knee ROM:  Lacking 9 degrees extension post treatment, lacking 12 degrees pre treatment  Flexion 101 post treatment with  OP    Treatment Today     TREATMENT MINUTES COMMENTS   Evaluation     Self-care/ Home management     Manual therapy 11 Grade II-III posterior glide to L femur -extension mobilization with oscillations    MFR around incision in 90 deg flexion to IT band, adductor, distal quad     Neuromuscular Re-education     Therapeutic Activity     Therapeutic Exercises 20 Nu-step WL 5, 5:00. Seat 7. Pt with self selected pace for ROM.    Exercises reviewed today:  Exercise #1: ROM flexion: heel slides with strech hold- using sheet/belt as needed, seated knee flx on chair  Comment #1: ROM extension: heel prop in extension, hamstring stretch and gastroc stretch x 30 seconds, 2-3 reps  Exercise #2: Quad set x 5 seconds x 10-15  Comment #2: Quad set with SLR x 10-15 reps   Exercise #3: SAQ, LAQ x 10-15 reps   Comment #3: Double leg squat - slowly, x 10 reps       Gait training     Modality__________________                Total 31    Blank areas are intentional and mean the treatment did not include these items.       Rabia Dean  4/9/2019
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
## 2021-05-27 NOTE — TELEPHONE ENCOUNTER
[facility] Nurse Triage Telephone Note      Provider: DELFINA James  Facility: [facility]    Facility Type: TCU    Caller: Lavinia   Call Back Number:  [phone]    Allergies: Mesalamine; Nsaids (non-steroidal anti-inflammatory drug); Tree nut; Hydrocod-cpm-pe-acetaminophen; Latex; Mometasone furoate; Nortriptyline; Paroxetine; Penicillins; Sulfamethoxazole-trimethoprim; Trazodone; and Latex    Reason for call: Nurse reporting Heme 2, BMP, and Mg results.       Verbal Order/Direction given by Provider: No new orders.      Provider giving order: DELFINA James    Verbal order given to: Lavinia Garza RN

## 2021-05-27 NOTE — PROGRESS NOTES
[facility] Daily Progress     Patient Name: Anna Neal  Date: 2019  Visit #:   PTA visit #:    Referral Diagnosis: left total knee replacement  Referring provider: Fady Christy MD  Visit Diagnosis:     ICD-10-CM    1. Decreased ROM of left knee M25.662    2. Antalgic gait R26.89    3. Generalized muscle weakness M62.81    4. Acute postoperative pain of left knee G89.18     M25.562        Assessment:     Pt is s/p TKA 3/19/19 with Dr. Dove at [facility].  Lives at home independently.  Today she has maintained her ROM since Tuesday.  Increased pain and swelling over night.  Discussed pain management and s/sx of DVT as pt is concerned about this.    Patient is benefitting from skilled physical therapy and is making steady progress toward functional goals.  Patient is appropriate to continue with skilled physical therapy intervention, as indicated by initial plan of care.    Goal Status:  Pt. will demonstrate/verbalize independence in self-management of condition in : 2 weeks  Pt. will be independent with home exercise program in : 2 weeks    Pt will: display L knee PROM extension <5 degrees and flexion >115 degrees to improve ambulation and ROM in 12 weeks  Pt will: ascend/descend stairs with reciprocal patternw with 1 HR to improve stars in her home in 12 weeks  Pt will: walk >15' on outdoor surfaces without AD without increased pain to walk in her garden in 12 weeks  Pt will: stand >15' without AD and without breaks to perform household chores: cooking, cleaning in 12 weeks      Plan / Patient Education:     Continue with initial plan of care.  Progress with home program as tolerated.     Subjective:     Very sore today.  Could not sleep last night, woke up every hour.  Has iced a lot.    Is worried about a possible DVT.    Pain Ratin    Objective:     Discussed she needs to use the walker especially when her gait pattern is antalgic like today.  I recommend she continue to  "follow the physicians medication prescription for pain management, and ice as much as possible.    No redness, no calf pain, no heat around the calf.  Knee is not red, incision is C/D/I.  Pain is mostly medical knee.  Discussed DVT precautions, if worsening redness or pain when she squeezes or pushes on her calf, significant pain behind the knee.    L knee ROM:  Lacking 5 degrees extension post treatment, lacking 9 degrees pre treatment  Flexion 114 degrees post treatment    Treatment Today     TREATMENT MINUTES COMMENTS   Evaluation     Self-care/ Home management     Manual therapy 18 Grade II-III posterior glide to L femur -extension mobilization with oscillations  Grade II-III posterior glide L tibia in hooklying ~90 deg flexion with oscillations  Prone quad stretch R knee    MFR around incision in 90 deg flexion to IT band, adductor, distal quad     Neuromuscular Re-education     Therapeutic Activity     Therapeutic Exercises 10 Nu-step WL 1, 5:00. Seat 7. Pt with self selected pace for ROM.    Exercises reviewed today: (see FS for date performed)  Exercise #1: ROM flexion: heel slides with strech hold- using sheet/belt as needed, seated knee flx on chair  Comment #1: ROM extension: heel prop in extension, hamstring stretch and gastroc stretch x 30 seconds, 2-3 reps  Exercise #2: Quad set x 5 seconds x 10-15  Comment #2: Quad set with SLR x 10-15 reps   Exercise #3: SAQ, LAQ x 10-15 reps   Comment #3: Double leg squat - slowly, x 10 reps  Exercise #4: Sit<>stand - still unable without significant favoring L knee  Comment #4: Knee flexion stair stretch 3x 30\"       Gait training     Modality__________________                Total 28    Blank areas are intentional and mean the treatment did not include these items.       Latha Lambert, DPT  4/18/2019  "

## 2021-05-27 NOTE — PROGRESS NOTES
[facility]      NAME:  Anna Neal             :  1945    MRN: [id number]    CODE STATUS:  FULL CODE    FACILITY: [facility] [[id number]]       CHIEF COMPLAIN/REASON FOR VISIT:  Chief Complaint   Patient presents with     Review Of Multiple Medical Conditions       HISTORY OF PRESENT ILLNESS: Anna Neal is a 74 y.o. female being [facility]  Left sided due to osteorathritis. She has h/o DJD. She has been doingf well in therapy, left knee with reduced redness and swelling. She reports she  Pain is stable, has not been taking vistaril , we will dc.Oxycodone 5-10 mg po prn has been effective.  On Xarelto for anticoagulation, fading bruising is noted to her left swollen knee. Reports she feels as though she is progressing well.    Allergies   Allergen Reactions     Mesalamine Rash     Nsaids (Non-Steroidal Anti-Inflammatory Drug) Anaphylaxis and Hives     Tree Nut Anaphylaxis     Hydrocod-Cpm-Pe-Acetaminophen Unknown     Headache     Latex      Added based on information entered during case entry, please review and add reactions, type, and severity as needed     Mometasone Furoate Unknown     epistaxis     Nortriptyline Unknown     Paroxetine Other (See Comments)     tired     Penicillins      Local swelling with injection at age 16     Sulfamethoxazole-Trimethoprim Hives     Trazodone Other (See Comments)     VERY TIRED  RLS;  JUMPED OUT OF MY SKIN;     Latex Rash     unknown   :     Current Outpatient Medications   Medication Sig     acetaminophen (TYLENOL) 500 MG tablet Take 2 tablets (1,000 mg total) by mouth 3 (three) times a day.     albuterol (PROAIR HFA;PROVENTIL HFA;VENTOLIN HFA) 90 mcg/actuation inhaler Inhale 2 puffs every 6 (six) hours as needed for wheezing.     ascorbic acid, vitamin C, (VITAMIN C) 500 MG tablet Take 500 mg by mouth daily.     cartilage/collagen/bor/hyalur (MOVE  FREE ULTRA, BORON, ORAL) Take 1 capsule by mouth daily.     cholecalciferol, vitamin D3, 1,000 unit tablet Take 5,000 Units by mouth daily.            EPINEPHrine (ADRENALIN) 1 mg/mL (1 mL) injection Inject into the shoulder, thigh, or buttocks as needed.            estradiol (ESTRACE) 0.01 % (0.1 mg/gram) vaginal cream Insert 2 g into the vagina once a week.            famotidine (PEPCID) 20 MG tablet Take 20 mg by mouth daily.     fexofenadine (ALLEGRA) 180 MG tablet Take 180 mg by mouth daily.     lactase (LACTAID) 3,000 unit tablet Take 3,000 Units by mouth as needed.     multivitamin with minerals (THERA-M) 9 mg iron-400 mcg Tab tablet Take 1 tablet by mouth daily.     mv,krysta,iron,mn/folic acid/chol (HAIR-SKIN-NAILS, PABA, ORAL) Take 1 capsule by mouth daily.     nicotine (NICODERM CQ) 7 mg/24 hr Place 1 patch on the skin daily as needed for smoking cessation.     omalizumab (XOLAIR) 150 mg injection Inject under the skin every 14 (fourteen) days.     oxyCODONE (ROXICODONE) 5 MG immediate release tablet Take 1-2 tablets (5-10 mg total) by mouth every 4 (four) hours as needed. Take 1 tab for pain 1-6/10, take 2 tabs for pain 7-10/10.     polyethylene glycol (MIRALAX) 17 gram packet Take 17 g by mouth daily.     rivaroxaban (XARELTO) 10 mg tablet Take 1 tablet (10 mg total) by mouth daily with supper.     senna-docusate (PERICOLACE) 8.6-50 mg tablet Take 1 tablet by mouth 2 (two) times a day as needed for constipation.     triamcinolone (KENALOG) 0.1 % cream Apply 1 application topically 2 (two) times a day as needed.         REVIEW OF SYSTEMS:    Currently, no fever, chills, or rigors. Does not have any visual or hearing problems. Denies any chest pain, headaches, palpitations, lightheadedness, dizziness, shortness of breath, or cough. Appetite is good. Denies any GERD symptoms. Denies any difficulty with swallowing, nausea, or vomiting.  Denies any abdominal pain, diarrhea or constipation. Denies any urinary  symptoms. No insomnia. No active bleeding. No rash.       PHYSICAL EXAMINATION:  Vitals:    03/28/19 1431   BP: 120/75   Temp: 97.9  F (36.6  C)         GENERAL: Awake, Alert, oriented x3, not in any form of acute distress, answers questions appropriately, follows simple commands, conversant  HEENT: Head is normocephalic with normal hair distribution. No evidence of trauma. Ears: No acute purulent discharge. Eyes: Conjunctivae pink with no scleral jaundice. Nose: Normal mucosa and septum. NECK: Supple with no cervical or supraclavicular lymphadenopathy. Trachea is midline.   CHEST: No tenderness or deformity, no crepitus  LUNG: Clear to auscultation with good chest expansion. There are no crackles or wheezes, normal AP diameter.  BACK: No kyphosis of the thoracic spine. Symmetric, no curvature, ROM normal, no CVA tenderness, no spinal tenderness   CVS: There is good S1  S2, there are no murmurs, rubs, gallops, or heaves, rhythm is regular.  ABDOMEN: Globular and soft, nontender to palpation, non distended, no masses, no organomegaly, good bowel sounds, no rebound or guarding, no peritoneal signs.   EXTREMITIES: Atraumatic. Left knee with limited range, knee with some swelling and redness. no pedal edema, no cyanosis or clubbing, no calf tenderness, normal cap refill, no joint swelling.  SKIN: Warm and dry, no erythema noted, no rashes or lesions.  NEUROLOGICAL: The patient is oriented to person, place and time. Strength and sensation are grossly intact. Face is symmetric.            LABS:    Lab Results   Component Value Date    WBC 5.9 03/26/2019    HGB 13.7 03/26/2019    HCT 40.5 03/26/2019    MCV 92 03/26/2019     03/26/2019       Results for orders placed or performed in visit on 03/26/19   Basic Metabolic Panel   Result Value Ref Range    Sodium 140 136 - 145 mmol/L    Potassium 3.8 3.5 - 5.0 mmol/L    Chloride 104 98 - 107 mmol/L    CO2 29 22 - 31 mmol/L    Anion Gap, Calculation 7 5 - 18 mmol/L     "Glucose 139 (H) 70 - 125 mg/dL    Calcium 9.9 8.5 - 10.5 mg/dL    BUN 16 8 - 28 mg/dL    Creatinine 0.89 0.60 - 1.10 mg/dL    GFR MDRD Af Amer >60 >60 mL/min/1.73m2    GFR MDRD Non Af Amer >60 >60 mL/min/1.73m2           Lab Results   Component Value Date    HGBA1C 7.0 (H) 02/26/2019     No results found for: [id number]  Lab Results   Component Value Date    [id number] 926 (H) 11/09/2017       ASSESSMENT/PLAN:  1. Primary osteoarthritis of left knee    2. Hx of total knee replacement, left      1.Left TKA/ and osteoarthritis\": no  report abnormal results to xray or ultrasound. . Continue therapy as ordered, pain controlled, on oxycodone prn.We will dc as per RN pt has not been utulizing thios medication.            Electronically signed by:  Sabi Gates CNP  This progress note was completed using Dragon software and there may be grammatical errors.        "

## 2021-05-27 NOTE — PROGRESS NOTES
[facility] Daily Progress     Patient Name: Anna Neal  Date: 2019  Visit #: 3/12  PTA visit #:    Referral Diagnosis: left total knee replacement  Referring provider: Fady Christy MD  Visit Diagnosis:     ICD-10-CM    1. Decreased ROM of left knee M25.662    2. Antalgic gait R26.89    3. Generalized muscle weakness M62.81    4. Acute postoperative pain of left knee G89.18     M25.562          Assessment:     Pt is s/p TKA 3/19/19 with Dr. Dove at [facility].  Lives at home independently.  Today she demonstrates good progression of ROM, some mild L knee extension deficit.  Her compliance with HEP has improved.  Her gait has improved without walker, though still lacks terminal knee extension.    Patient is benefitting from skilled physical therapy and is making steady progress toward functional goals.  Patient is appropriate to continue with skilled physical therapy intervention, as indicated by initial plan of care.    Goal Status:  Pt. will demonstrate/verbalize independence in self-management of condition in : 2 weeks  Pt. will be independent with home exercise program in : 2 weeks    Pt will: display L knee PROM extension <5 degrees and flexion >115 degrees to improve ambulation and ROM in 12 weeks  Pt will: ascend/descend stairs with reciprocal patternw with 1 HR to improve stars in her home in 12 weeks  Pt will: walk >15' on outdoor surfaces without AD without increased pain to walk in her garden in 12 weeks  Pt will: stand >15' without AD and without breaks to perform household chores: cooking, cleaning in 12 weeks      Plan / Patient Education:     Continue with initial plan of care.  Progress with home program as tolerated.     Subjective:     Has been trying to walk a little more without her walker.  This seems to be going well.  Last night/yesterday she had increased muscle pain and spasms    Pain Ratin    Objective:     Improved ambulation with walker, improved  terminal knee ext since last session    L knee ROM:  Lacking 6 degrees extension post treatment, lacking 9 degrees pre treatment  Flexion 114 degrees with heel slides    Treatment Today     TREATMENT MINUTES COMMENTS   Evaluation     Self-care/ Home management     Manual therapy 20 Grade II-III posterior glide to L femur -extension mobilization with oscillations  Grade II-III posterior glide L tibia in hooklying ~90 deg flexion with oscillations  Prone quad stretch R knee    MFR around incision in 90 deg flexion to IT band, adductor, distal quad     Neuromuscular Re-education     Therapeutic Activity     Therapeutic Exercises 10 Nu-step WL 1, 5:00. Seat 7. Pt with self selected pace for ROM.    Exercises reviewed today: (see FS for date performed)  Exercise #1: ROM flexion: heel slides with strech hold- using sheet/belt as needed, seated knee flx on chair  Comment #1: ROM extension: heel prop in extension, hamstring stretch and gastroc stretch x 30 seconds, 2-3 reps  Exercise #2: Quad set x 5 seconds x 10-15  Comment #2: Quad set with SLR x 10-15 reps   Exercise #3: SAQ, LAQ x 10-15 reps   Comment #3: Double leg squat - slowly, x 10 reps  Exercise #4: Sit<>stand - still unable without significant favoring L knee       Gait training     Modality__________________                Total 31    Blank areas are intentional and mean the treatment did not include these items.       Latha Lambert  4/16/2019

## 2021-05-27 NOTE — PROGRESS NOTES
[facility]      NAME:  Anna Neal             :  1945    MRN: [id number]    CODE STATUS:  FULL CODE    FACILITY: [facility] [[id number]]       CHIEF COMPLAIN/REASON FOR VISIT:  Chief Complaint   Patient presents with     Review Of Multiple Medical Conditions       HISTORY OF PRESENT ILLNESS: Anna Neal is a 74 y.o. female being [facility]  Left sided due to osteorathritis. She has h/o djd. Seen today, left knee is red and swollen. She reports she twisted her knee transferring and swelling increased. Nursing staff were directed to update ortho, we can obtain xrays or US here or if they would prefer to see her in the office. Also reports having a vaginal candidis.   Pain is stable. On Xarelto for anticoagulation, fading bruising is noted to her left swollen knee.    Allergies   Allergen Reactions     Mesalamine Rash     Nsaids (Non-Steroidal Anti-Inflammatory Drug) Anaphylaxis and Hives     Tree Nut Anaphylaxis     Hydrocod-Cpm-Pe-Acetaminophen Unknown     Headache     Latex      Added based on information entered during case entry, please review and add reactions, type, and severity as needed     Mometasone Furoate Unknown     epistaxis     Nortriptyline Unknown     Paroxetine Other (See Comments)     tired     Penicillins      Local swelling with injection at age 16     Sulfamethoxazole-Trimethoprim Hives     Trazodone Other (See Comments)     VERY TIRED  RLS;  JUMPED OUT OF MY SKIN;     Latex Rash     unknown   :     Current Outpatient Medications   Medication Sig     polyethylene glycol (MIRALAX) 17 gram packet Take 17 g by mouth daily.     acetaminophen (TYLENOL) 500 MG tablet Take 2 tablets (1,000 mg total) by mouth 3 (three) times a day.     albuterol (PROAIR HFA;PROVENTIL HFA;VENTOLIN HFA) 90 mcg/actuation inhaler Inhale 2 puffs every 6 (six) hours as needed for wheezing.      ascorbic acid, vitamin C, (VITAMIN C) 500 MG tablet Take 500 mg by mouth daily.     cartilage/collagen/bor/hyalur (MOVE FREE ULTRA, BORON, ORAL) Take 1 capsule by mouth daily.     cholecalciferol, vitamin D3, 1,000 unit tablet Take 5,000 Units by mouth daily.            EPINEPHrine (ADRENALIN) 1 mg/mL (1 mL) injection Inject into the shoulder, thigh, or buttocks as needed.            estradiol (ESTRACE) 0.01 % (0.1 mg/gram) vaginal cream Insert 2 g into the vagina once a week.            famotidine (PEPCID) 20 MG tablet Take 20 mg by mouth daily.     fexofenadine (ALLEGRA) 180 MG tablet Take 180 mg by mouth daily.     hydrOXYzine pamoate (VISTARIL) 25 MG capsule Take 1 capsule (25 mg total) by mouth every 6 (six) hours as needed (pain, muscle spasms, itching, anxiety). Hold for sedation. (Patient taking differently: Take 25 mg by mouth 3 (three) times a day. Hold for sedation.      )     lactase (LACTAID) 3,000 unit tablet Take 3,000 Units by mouth as needed.     multivitamin with minerals (THERA-M) 9 mg iron-400 mcg Tab tablet Take 1 tablet by mouth daily.     mv,krysta,iron,mn/folic acid/chol (HAIR-SKIN-NAILS, PABA, ORAL) Take 1 capsule by mouth daily.     nicotine (NICODERM CQ) 7 mg/24 hr Place 1 patch on the skin daily as needed for smoking cessation.     omalizumab (XOLAIR) 150 mg injection Inject under the skin every 14 (fourteen) days.     oxyCODONE (ROXICODONE) 5 MG immediate release tablet Take 1-2 tablets (5-10 mg total) by mouth every 4 (four) hours as needed. Take 1 tab for pain 1-6/10, take 2 tabs for pain 7-10/10.     rivaroxaban (XARELTO) 10 mg tablet Take 1 tablet (10 mg total) by mouth daily with supper.     senna-docusate (PERICOLACE) 8.6-50 mg tablet Take 1 tablet by mouth 2 (two) times a day as needed for constipation.     triamcinolone (KENALOG) 0.1 % cream Apply 1 application topically 2 (two) times a day as needed.         REVIEW OF SYSTEMS:    Currently, no fever, chills, or rigors. Does not  have any visual or hearing problems. Denies any chest pain, headaches, palpitations, lightheadedness, dizziness, shortness of breath, or cough. Appetite is good. Denies any GERD symptoms. Denies any difficulty with swallowing, nausea, or vomiting.  Denies any abdominal pain, diarrhea or constipation. Denies any urinary symptoms. No insomnia. No active bleeding. No rash.       PHYSICAL EXAMINATION:  Vitals:    03/25/19 1635   BP: 147/84   Pulse: 81   Temp: 98.4  F (36.9  C)   Weight: 171 lb 12.8 oz (77.9 kg)         GENERAL: Awake, Alert, oriented x3, not in any form of acute distress, answers questions appropriately, follows simple commands, conversant  HEENT: Head is normocephalic with normal hair distribution. No evidence of trauma. Ears: No acute purulent discharge. Eyes: Conjunctivae pink with no scleral jaundice. Nose: Normal mucosa and septum. NECK: Supple with no cervical or supraclavicular lymphadenopathy. Trachea is midline.   CHEST: No tenderness or deformity, no crepitus  LUNG: Clear to auscultation with good chest expansion. There are no crackles or wheezes, normal AP diameter.  BACK: No kyphosis of the thoracic spine. Symmetric, no curvature, ROM normal, no CVA tenderness, no spinal tenderness   CVS: There is good S1  S2, there are no murmurs, rubs, gallops, or heaves, rhythm is regular.  ABDOMEN: Globular and soft, nontender to palpation, non distended, no masses, no organomegaly, good bowel sounds, no rebound or guarding, no peritoneal signs.   EXTREMITIES: Atraumatic. Left knee with limited range, knee with increased swelling and redness. no pedal edema, no cyanosis or clubbing, no calf tenderness, normal cap refill, no joint swelling.  SKIN: Warm and dry, no erythema noted, no rashes or lesions.  NEUROLOGICAL: The patient is oriented to person, place and time. Strength and sensation are grossly intact. Face is symmetric.            LABS:    Lab Results   Component Value Date    WBC 6.0 03/19/2019     HGB 13.3 03/21/2019    HCT 43.0 03/19/2019    MCV 89 03/19/2019     03/19/2019       Results for orders placed or performed during the hospital encounter of 11/08/17   Basic Metabolic Panel   Result Value Ref Range    Sodium 138 136 - 145 mmol/L    Potassium 4.3 3.5 - 5.0 mmol/L    Chloride 104 98 - 107 mmol/L    CO2 27 22 - 31 mmol/L    Anion Gap, Calculation 7 5 - 18 mmol/L    Glucose 133 (H) 70 - 125 mg/dL    Calcium 9.1 8.5 - 10.5 mg/dL    BUN 17 8 - 28 mg/dL    Creatinine 0.99 0.60 - 1.10 mg/dL    GFR MDRD Af Amer >60 >60 mL/min/1.73m2    GFR MDRD Non Af Amer 55 (L) >60 mL/min/1.73m2           Lab Results   Component Value Date    HGBA1C 7.0 (H) 02/26/2019     No results found for: [id number]  Lab Results   Component Value Date    [id number] 926 (H) 11/09/2017       ASSESSMENT/PLAN:  1. Osteoarthritis, localized, knee    2. Weakness    3. Hx of total knee replacement, left      1.Left TKA/Weakness: XRAY and US to left knee per ortho, report abnormal results to them if present. Continu therapy as ordered, pain controlled, on oxycodone prn.    2. Reports vaginal candidas: Flagyl 150 mg po x 1 dose only.    3. POLST: Reviewed with pt and signed as full code per pt request.      Electronically signed by:  Sabi Gates CNP  This progress note was completed using Dragon software and there may be grammatical errors.      45 minutes spent of which greater than 25 minutes was face to face communication with the patient about above plan of care for TCU regime, her pain management program, f./u of knee imaging and her POLST wishes.

## 2021-05-28 NOTE — PROGRESS NOTES
[facility] Daily Progress     Patient Name: Anna Neal  Date: 4/30/2019  Visit #: 6/12  PTA visit #:    Referral Diagnosis: left total knee replacement  Referring provider: Isra Dove MD  Visit Diagnosis:     ICD-10-CM    1. Decreased ROM of left knee M25.662    2. Antalgic gait R26.89    3. Generalized muscle weakness M62.81    4. Acute postoperative pain of left knee G89.18     M25.562        Assessment:     Pt is s/p L TKA 3/19/19 with Dr. Dove at [facility].  She has improving ROM 5-118 degrees.  She still does have swelling and pitting edema in her LLE not unusual at this point in recovery.  She uses ice at home, especially at night and reports taking pain medication however it does not seem to do much and does not help her sleep.  We discussed sleep hygiene, we discussed blood flow, healing, time to recovery.  Pt verbalizes an understanding.      We reviewed HEP today and pt required much cueing.  I do not believe she is very compliant at home.  She reports she is feeling depressed and feels overwhelmed.  Emotional/mental health will likely be a factor in her recovery.    Goal Status:  Pt. will demonstrate/verbalize independence in self-management of condition in : 2 weeks  Pt. will be independent with home exercise program in : 2 weeks    Pt will: display L knee PROM extension <5 degrees and flexion >115 degrees to improve ambulation and ROM in 12 weeks  Pt will: ascend/descend stairs with reciprocal patternw with 1 HR to improve stars in her home in 12 weeks  Pt will: walk >15' on outdoor surfaces without AD without increased pain to walk in her garden in 12 weeks  Pt will: stand >15' without AD and without breaks to perform household chores: cooking, cleaning in 12 weeks      Plan / Patient Education:     Continue with initial plan of care.  Progress with home program as tolerated.   Next visit: Upright bike, increase WB exercises, progress to prone quad stretch with  "strap.    Subjective:     Pt's reports she had \"the worst night of her life\" last night because of spasms in the knee.  She reports that in the middle of the night   Pain Ratin/10    Objective:     Pt education: We discussed sleep hygiene, we discussed blood flow, healing, time to recovery.  2. Sensitive nerves.  Patient educated on the concept of the nervous system as the bodies alarm system, and the role of nociception to warn the body of danger, that after surgery this is extremely heightened and worsened by things like poor nutrition, lack of sleep, stress. Peripheral nerve sensitization, hyperalgesia and allodynia were explained.  Given ideas to improve these areas.   We discussed desensitizing and rubbing her knee with a towel.  By the end of session she could not verbalize these concepts or what we had talked about.  Educated that quad spasms can be a result of not getting stretched, pt may do prone hamstring culr or standing hamstring curl to get a gentle quad stretch.    L knee ROM:  Lacking 5 degrees extension  Flexion 118 degrees post treatment    Treatment Today     TREATMENT MINUTES COMMENTS   Evaluation     Self-care/ Home management 10 Pt education as above.     Manual therapy 10 QUADRICEPS stretch prone, 5x 30\" with PT and with bolster under her knee    Grade II-III posterior glide to L femur -extension mobilization with oscillations  Grade II-III posterior glide L tibia in hooklying ~90 deg flexion with oscillations  Prone quad stretch R knee     Neuromuscular Re-education     Therapeutic Activity     Therapeutic Exercises 20 Upright bike 5' no resistance.  Reviewed ALL of her HEP.  Pt was not very engaged during exercise.  Changed topics several times, modified her own exercises, difficult to keep on taks.  Exercises:  Exercise #1: Heel slides 10x  Comment #1: Heel prop : Reviewed for HEP, to perform while icing  Exercise #2: Quad set x 5 seconds x 10  Comment #2: Quad set with SLR x " "10  Exercise #3: LAQ at EOB, allowed pt to \"swing\" it as this feels relieving 3'  Comment #3: Mini squat 10x  Exercise #4: Hamstring stretch on table 30\"  Comment #4: Toe/heel raises 10x  Exercise #5: Standing hip abduction 10x cynthia many cues for form  Exercise #6: Standing hamstring curl 10x       Gait training     Modality__________________                Total 40    Blank areas are intentional and mean the treatment did not include these items.       Latha Lambert, DPT  4/30/2019  "

## 2021-05-28 NOTE — PROGRESS NOTES
"[facility] Daily [facility]     Patient Name: Anna Neal  Date: 5/14/2019  Visit #: 8/12  PTA visit #:    Referral Diagnosis: left total knee replacement  Referring provider: Isra Dove MD  Visit Diagnosis:     ICD-10-CM    1. Decreased ROM of left knee M25.662    2. Antalgic gait R26.89    3. Generalized muscle weakness M62.81    4. Acute postoperative pain of left knee G89.18     M25.562        Assessment:     Pt is s/p L TKA 3/19/19 with Dr. Dove at [facility].  Flexion ROM improving well, extension has worsened since 1 weeks ago.  She has discontinued wearing the immobilizer at night because it is too painful.  She is not sure what to do about her medications she reports \"two doctors are telling me two different things\", meaning her primary and surgeon.  I did report that I will not advise her in regards to medication, she needs to discuss options with her physicians.  Emphasized again that she needs to be using a cane.  Her gait pattern is antalgic and she does not have terminal knee extension, this awkward gait will continue to cause overloading of the patellar tendon/quads and likely is causing her increased back pain.  I do believe there is an emotional component contributing to her pain, pt does seem very anxious and reports that being in pain so long is depressing.  We have discussed diaphragmatic breathing to calm down the nervous system and help reduce pain.    Goal Status:  Pt. will demonstrate/verbalize independence in self-management of condition in : 2 weeks  Pt. will be independent with home exercise program in : 2 weeks    Pt will: display L knee PROM extension <5 degrees and flexion >115 degrees to improve ambulation and ROM in 12 weeks  Pt will: ascend/descend stairs with reciprocal patternw with 1 HR to improve stars in her home in 12 weeks  Pt will: walk >15' on outdoor surfaces without AD without increased pain to walk in her garden in 12 weeks  Pt will: stand >15' " "without AD and without breaks to perform household chores: cooking, cleaning in 12 weeks      Plan / Patient Education:     Continue with initial plan of care.  Progress with home program as tolerated.   Next visit: May trial TENs, ice, during session to reduce pain, recommend hour long sessions.    Subjective:     Pt reports the surgeon is telling her one thing and her primary is telling her another in regards to what medication to take.  She does not know what to do.  Reports she is having some back pain too.      Pain Ratin/10    Objective:     Pt education:  Educated her that I cannot advise her about what to do in regards to medications.  She should continue to ice frequently and use the compression sleeve, as swelling and pain are still very limiting to her.  She should also be using her cane, as her gait deviations are causing increased load, pain, and abnormal pattern likely causing her back pain.  I advised her to stop walking as much, as she is limping around every time she walks and that she should just focus on the exercises, RICE treatment.    L knee ROM:  Lacking 10 degrees extension today post-treatment  Flexion 115 degrees post treatment    Treatment Today     TREATMENT MINUTES COMMENTS   Evaluation     Self-care/ Home management 10 Pt education as above.     Manual therapy 10 QUADRICEPS stretch prone, 5x 30\" with PT and with bolster under her knee    Grade II-III posterior glide to L femur -extension mobilization with oscillations  Grade II-III posterior glide L tibia in hooklying ~90 deg flexion with oscillations     Neuromuscular Re-education     Therapeutic Activity     Therapeutic Exercises 10 Upright bike 5' no resistance.     Exercises:  Exercise #1: Heel slides HEP  Comment #1: Heel prop : Had to review again today  Exercise #2: Quad set x 5 seconds x 10  Comment #2: Quad set with SLR x 10 lag today  Exercise #3: LAQ at EOB, allowed pt to \"swing\" it as this feels relieving 3'  Comment #3: " "Mini squat 10x  Exercise #4: Hamstring stretch on table 30\"  Comment #4: Toe/heel raises 10x  Exercise #5: Standing hip abduction 10x cynthia many cues for form  Exercise #6: Standing hamstring curl 10x  Comment #6: Reviewed heel prop for home, pt could not remember what to do  Exercise #7: Standing TKE ball on wall 10x 10\"    Discussed diaphragmatic breathing to reduce stress and pain   Gait training     Modality__________________                Total 30    Blank areas are intentional and mean the treatment did not include these items.       Latha Lambert, DPT  5/14/2019  "

## 2021-05-28 NOTE — PROGRESS NOTES
[facility] Daily Progress     Patient Name: Anna Neal  Date: 5/9/2019  Visit #: 7/12  PTA visit #:    Referral Diagnosis: left total knee replacement  Referring provider: Isra Dove MD  Visit Diagnosis:     ICD-10-CM    1. Decreased ROM of left knee M25.662    2. Antalgic gait R26.89    3. Generalized muscle weakness M62.81    4. Acute postoperative pain of left knee G89.18     M25.562        Assessment:     Pt is s/p L TKA 3/19/19 with Dr. Dove at [facility].  Flexion ROM improving well, extension is still lacking and has worsened overnight with her immobilizer (increased pain).  Gait is antalgic today and lacking TKE.  SLR today demonstrates quad lag, previously did not.  Sx improved today after therapy.  Terminal knee extensions are difficult and painful.  I do believe there is an emotional component contributing to her pain, pt does seem very stressed and anxious.    Goal Status:  Pt. will demonstrate/verbalize independence in self-management of condition in : 2 weeks  Pt. will be independent with home exercise program in : 2 weeks    Pt will: display L knee PROM extension <5 degrees and flexion >115 degrees to improve ambulation and ROM in 12 weeks  Pt will: ascend/descend stairs with reciprocal patternw with 1 HR to improve stars in her home in 12 weeks  Pt will: walk >15' on outdoor surfaces without AD without increased pain to walk in her garden in 12 weeks  Pt will: stand >15' without AD and without breaks to perform household chores: cooking, cleaning in 12 weeks      Plan / Patient Education:     Continue with initial plan of care.  Progress with home program as tolerated.   Next visit: Upright bike, increase WB exercises, progress to prone quad stretch with strap.    Subjective:     Pt reports the surgeon saw her yesterday and is happy with her recovery.  He did give her an immobilizer for sleeping in which seemed to help the cramping but increased her soreness.  Pain  "Ratin/10    Objective:     L knee ROM:  Lacking 5 degrees extension  Flexion 118 degrees post treatment    Treatment Today     TREATMENT MINUTES COMMENTS   Evaluation     Self-care/ Home management  Pt education as above.     Manual therapy 10 QUADRICEPS stretch prone, 5x 30\" with PT and with bolster under her knee    Grade II-III posterior glide to L femur -extension mobilization with oscillations  Grade II-III posterior glide L tibia in hooklying ~90 deg flexion with oscillations     Neuromuscular Re-education     Therapeutic Activity     Therapeutic Exercises 30 Upright bike 5' no resistance.   Pt was not very engaged during exercise.  Changed topics several times, modified her own exercises, difficult to keep on taks.  Exercises:  Exercise #1: Heel slides HEP  Comment #1: Heel prop : Had to review again today  Exercise #2: Quad set x 5 seconds x 10  Comment #2: Quad set with SLR x 10 lag today  Exercise #3: LAQ at EOB, allowed pt to \"swing\" it as this feels relieving 3'  Comment #3: Mini squat 10x  Exercise #4: Hamstring stretch on table 30\"  Comment #4: Toe/heel raises 10x  Exercise #5: Standing hip abduction 10x cynthia many cues for form  Exercise #6: Standing hamstring curl 10x  Comment #6: Reviewed heel prop for home, pt could not remember what to do  Exercise #7: Standing TKE ball on wall 10x 10\"    Discussed diaphragmatic breathing to reduce stress and pain   Gait training     Modality__________________                Total 40    Blank areas are intentional and mean the treatment did not include these items.       Latha Lambert, DPT  2019  "

## 2021-05-28 NOTE — PROGRESS NOTES
"[facility] Daily Progress     Patient Name: Anna Neal  Date: 2019  Visit #:   PTA visit #:    Referral Diagnosis: left total knee replacement  Referring provider: Isra Dove MD  Visit Diagnosis:     ICD-10-CM    1. Decreased ROM of left knee M25.662    2. Antalgic gait R26.89    3. Generalized muscle weakness M62.81    4. Acute postoperative pain of left knee G89.18     M25.562        Assessment:     Pt is in too much pain to tolerate therapy today.  Trialed TENs unit and ice, and educated pt on walking with a cane.  Pt did feel better by the end of session, pt to f/u with lumbar MRI (not yet scheduled).    Goal Status:  Pt. will demonstrate/verbalize independence in self-management of condition in : 2 weeks  Pt. will be independent with home exercise program in : 2 weeks    Pt will: display L knee PROM extension <5 degrees and flexion >115 degrees to improve ambulation and ROM in 12 weeks  Pt will: ascend/descend stairs with reciprocal patternw with 1 HR to improve stars in her home in 12 weeks  Pt will: walk >15' on outdoor surfaces without AD without increased pain to walk in her garden in 12 weeks  Pt will: stand >15' without AD and without breaks to perform household chores: cooking, cleaning in 12 weeks      Plan / Patient Education:     Continue with initial plan of care.  Progress with home program as tolerated.   Next visit: May trial TENs, ice, during session to reduce pain, recommend hour long sessions.    Subjective:     Pt reports being miserable.  She is worse than ever.  She does report that she is going to have an MRI for her lumbar spine.    Pain Ratin/10    Objective:     L knee ROM:  Lacking 10 degrees extension today post-treatment  Flexion 115 degrees post treatment    Treatment Today     TREATMENT MINUTES COMMENTS   Evaluation     Self-care/ Home management  Pt education as above.     Manual therapy  QUADRICEPS stretch prone, 5x 30\" with PT and with " "bolster under her knee    Grade II-III posterior glide to L femur -extension mobilization with oscillations  Grade II-III posterior glide L tibia in hooklying ~90 deg flexion with oscillations     Neuromuscular Re-education     Therapeutic Activity     Therapeutic Exercises NP today Upright bike 5' no resistance.     Exercises:  Exercise #1: Heel slides HEP  Comment #1: Heel prop : Reviewed  Exercise #2: Quad set x 5 seconds x 10  Comment #2: Quad set with SLR x 10 lag today  Exercise #3: LAQ at EOB, allowed pt to \"swing\" it as this feels relieving 3'  Comment #3: Mini squat 10x  Exercise #4: Hamstring stretch on table 30\"  Comment #4: Toe/heel raises 10x  Exercise #5: Standing hip abduction 10x cynthia many cues for form  Exercise #6: Standing hamstring curl 10x  Comment #6: Reviewed heel prop for home, pt could not remember what to do  Exercise #7: Standing TKE ball on wall 10x 10\"    Discussed diaphragmatic breathing to reduce stress and pain   Gait training 8 Gait with SPC   Modality__________________ 17 Unattended TENs lumbar spine.  Trialed on left knee however this increased \"twitching\"              Total 25    Blank areas are intentional and mean the treatment did not include these items.       Latha Lambert, MICHAELT  5/16/2019  "

## 2021-05-28 NOTE — PROGRESS NOTES
[facility] Daily Progress     Patient Name: Anna Neal  Date: 4/25/2019  Visit #: 5/12  PTA visit #:    Referral Diagnosis: left total knee replacement  Referring provider: Fady Christy MD  Visit Diagnosis:     ICD-10-CM    1. Decreased ROM of left knee M25.662    2. Antalgic gait R26.89    3. Generalized muscle weakness M62.81    4. Acute postoperative pain of left knee G89.18     M25.562        Assessment:     Pt is s/p L TKA 3/19/19 with Dr. Dove at [facility]  Lives at home independently.  She has maintained her ROM 5-115 degrees over the last week.  She still does have significant swelling and pitting edema in her LLE.  She uses ice at home, especially at night and reports taking pain medication however it does not seem to do much and does not help her sleep.  We discussed the importance of pain management and sleep in order to heal and if her sx are this painful she should call the PA and discuss her medication or other options.    She has not been compliant at home.  She reports she is feeling depressed and feels overwhelmed.  We went through her exercises and again discussed which ones are important at this time.  She has kept all her exercises she has ever gotten, I do not want her to do these I want her to do only the ones we have given her.    Patient is benefitting from skilled physical therapy and is making steady progress toward functional goals.  Patient is appropriate to continue with skilled physical therapy intervention, as indicated by initial plan of care.    Goal Status:  Pt. will demonstrate/verbalize independence in self-management of condition in : 2 weeks  Pt. will be independent with home exercise program in : 2 weeks    Pt will: display L knee PROM extension <5 degrees and flexion >115 degrees to improve ambulation and ROM in 12 weeks  Pt will: ascend/descend stairs with reciprocal patternw with 1 HR to improve stars in her home in 12 weeks  Pt will: walk  >15' on outdoor surfaces without AD without increased pain to walk in her garden in 12 weeks  Pt will: stand >15' without AD and without breaks to perform household chores: cooking, cleaning in 12 weeks      Plan / Patient Education:     Continue with initial plan of care.  Progress with home program as tolerated.   Next visit: Upright bike, mobilizations, review exercises again    Subjective:     Pt's pain is not managed.  She was up all night due to the pain with ice and her meds.  She feels depressed since she feels like she cannot do anything.    Pain Ratin/10    Objective:     Discussed she needs to use the walker especially when her gait pattern is antalgic like today.  I recommend she continue to follow the physicians medication prescription for pain management, and ice as much as possible.    L knee ROM:  Lacking 5 degrees extension post treatment, lacking 9 degrees pre treatment  Flexion 114 degrees post treatment    Treatment Today     TREATMENT MINUTES COMMENTS   Evaluation     Self-care/ Home management     Manual therapy 15 Grade II-III posterior glide to L femur -extension mobilization with oscillations  Grade II-III posterior glide L tibia in hooklying ~90 deg flexion with oscillations  Prone quad stretch R knee    MFR around incision in 90 deg flexion to IT band, adductor, distal quad     Neuromuscular Re-education     Therapeutic Activity     Therapeutic Exercises 20 Nu-step WL 1, 5:00. Seat 7. Pt with self selected pace for ROM.   Gait training     Modality__________________                Total 35    Blank areas are intentional and mean the treatment did not include these items.       Latha Lambert, PRINCE  2019

## 2021-05-29 NOTE — PROGRESS NOTES
"[facility] Daily Progress     Patient Name: Anna Neal  Date: 2019  Visit #: 10/12  PTA visit #:  1  Referral Diagnosis: left total knee replacement  Referring provider: Adelaide Rubio CNP  Visit Diagnosis:     ICD-10-CM    1. Decreased ROM of left knee M25.662    2. Antalgic gait R26.89    3. Generalized muscle weakness M62.81    4. Acute postoperative pain of left knee G89.18     M25.562        Assessment:     Pt continues to be very focused on the pain. Overall she does very well with the exercises.   Pt continues to have difficulty sleeping and is very focused on this.    She had a f/u with Dr. Cortes, per patient report they believe she may have had a stroke during her knee surgery which would explain her leg sx.  They are continuing to follow up about this.        Pain is still not managed well.  I believe her resistance to behavioral therapy is a barrier to her recovery.    Goal Status:  Pt. will demonstrate/verbalize independence in self-management of condition in : 2 weeks  Pt. will be independent with home exercise program in : 2 weeks    Pt will: display L knee PROM extension <5 degrees and flexion >115 degrees to improve ambulation and ROM in 12 weeks  Pt will: ascend/descend stairs with reciprocal patternw with 1 HR to improve stars in her home in 12 weeks  Pt will: walk >15' on outdoor surfaces without AD without increased pain to walk in her garden in 12 weeks  Pt will: stand >15' without AD and without breaks to perform household chores: cooking, cleaning in 12 weeks      Plan / Patient Education:     Continue with initial plan of care.  Progress with home program as tolerated.   Next visit:  Trial standing TKE  Go over new exercises.    Subjective:         Pain Ratin/10  \" I didn't sleep well last night.\" Pt states her legs \"jump\" all night.   Pt has continued to ice.   Pt has been compliant with her HEP.  Pt states that she does a lot of stairs every day. \" This " "makes me tired.\"    Objective:     L knee ROM:  Lacking 7 degrees extension today post-treatment  Flexion 120 degrees     Treatment Today     TREATMENT MINUTES COMMENTS   Evaluation     Self-care/ Home management  Pt education as above.     Manual therapy 8 QUADRICEPS stretch prone, 5x 30\" with PT - pushing inferior on sacrum helped reduce back pain during this.         Neuromuscular Re-education     Therapeutic Activity     Therapeutic Exercises 20 Upright bike 5' no resistance.     Exercises:  Exercise #1: Heel slides 5x - measured today  Comment #1: Heel prop : Reviewed again  Exercise #2: Quad set - NP  Comment #2: Quad set with SLR x 10 mild lag  Exercise #3: LAQ at EOB, allowed pt to \"swing\" it as this feels relieving 3'  Comment #3: Mini squat 10x good form, no favoring  Exercise #4: Hamstring stretch on table 30\"  Comment #4: Toe/heel raises 10x  Exercise #5: Standing hip abduction 10x cynthia many cues for form  Exercise #6: Standing hamstring curl 10x  Comment #6: Reviewed heel prop for home, pt could not remember what to do  Exercise #7: Standing TKE ball on wall 10x 10\"  Exercise #8: forward mini lunges   Comment #8: x10  Exercise #9: forward step ups on 4' step  Comment #9: x10  Exercise #10: sit to stand   Comment #10: x10       Gait training     Modality__________________                Total 30    Blank areas are intentional and mean the treatment did not include these items.       Nurys Travis, PTA,CLT  6/17/2019  "

## 2021-05-29 NOTE — PROGRESS NOTES
[facility] Daily Progress     Patient Name: Anna Neal  Date: 6/13/2019  Visit #: 9/12  PTA visit #:    Referral Diagnosis: left total knee replacement  Referring provider: Adelaide Rubio CNP  Visit Diagnosis:     ICD-10-CM    1. Decreased ROM of left knee M25.662    2. Antalgic gait R26.89    3. Generalized muscle weakness M62.81    4. Acute postoperative pain of left knee G89.18     M25.562        Assessment:     Pt has returned after several weeks.  She had a hospital stay at [facility] because of pain an insomnia.  She perseverates on her pain and is very difficult to redirect.  Pt has several times declined behavioral therapy but I think this would be very helpful.    She had a f/u with Dr. Cortes, per patient report they believe she may have had a stroke during her knee surgery which would explain her leg sx.  They are continuing to follow up about this.    We re-initiated her old exercises today.  Pt does demonstrate improved form per mini-squat, and knee ROM -10 - 115 degrees.  The lack of knee extension is mildly concerning however flexion seems progressed well.    Pain is still not managed well.  I believe her resistance to behavioral therapy is a barrier to her recovery.    Goal Status:  Pt. will demonstrate/verbalize independence in self-management of condition in : 2 weeks  Pt. will be independent with home exercise program in : 2 weeks    Pt will: display L knee PROM extension <5 degrees and flexion >115 degrees to improve ambulation and ROM in 12 weeks  Pt will: ascend/descend stairs with reciprocal patternw with 1 HR to improve stars in her home in 12 weeks  Pt will: walk >15' on outdoor surfaces without AD without increased pain to walk in her garden in 12 weeks  Pt will: stand >15' without AD and without breaks to perform household chores: cooking, cleaning in 12 weeks      Plan / Patient Education:     Continue with initial plan of care.  Progress with home program as  "tolerated.   Next visit: Progress to WB activities.  Trial mini lunges, band walks, step ups/downs, sit<>stands, standing TKE    Subjective:     Pt reports initially that she is better.  Pt reports she had a hospital stay at [facility], and this was very difficult, she continues to have a lot of leg and knee pain.  She reports she followed up with neurology and they told her she likely had a stroke during surgery and this may be causing her leg spasms/pain.  She reports she is sleeping better, as the lack of sleeping has been her biggest complaint.  She reports that she still cannot sleep well.  She did 'fire' her GP as they were not having a good relationship.    Pain Ratin/10    Objective:     L knee ROM:  Lacking 10 degrees extension today post-treatment  Flexion 115 degrees (This appears better while she is biking, but when measuring she had too much pain to go further)    Treatment Today     TREATMENT MINUTES COMMENTS   Evaluation     Self-care/ Home management  Pt education as above.     Manual therapy 10 QUADRICEPS stretch prone, 5x 30\" with PT - pushing inferior on sacrum helped reduce back pain during this.    Knee mobilizations extension/flexion with passive stretch.     Neuromuscular Re-education     Therapeutic Activity     Therapeutic Exercises 20 Upright bike 5' no resistance.     Exercises:  Exercise #1: Heel slides 5x - measured today  Comment #1: Heel prop : Reviewed again  Exercise #2: Quad set - NP  Comment #2: Quad set with SLR x 10 mild lag  Exercise #3: LAQ at EOB, allowed pt to \"swing\" it as this feels relieving 3'  Comment #3: Mini squat 10x good form, no favoring  Exercise #4: Hamstring stretch on table 30\"  Comment #4: Toe/heel raises 10x  Exercise #5: Standing hip abduction 10x cynthia many cues for form  Exercise #6: Standing hamstring curl 10x  Comment #6: Reviewed heel prop for home, pt could not remember what to do  Exercise #7: Standing TKE ball on wall 10x 10\"    Discussed diaphragmatic " breathing to reduce stress and pain   Gait training     Modality__________________                Total 30    Blank areas are intentional and mean the treatment did not include these items.       Latha Lambert DPT  6/13/2019

## 2021-05-29 NOTE — PROGRESS NOTES
"[facility] Daily Progress     Patient Name: Anna Neal  Date: 2019  Visit #:   PTA visit #:  1  Referral Diagnosis: left total knee replacement  Referring provider: Adelaide Rubio CNP  Visit Diagnosis:     ICD-10-CM    1. Decreased ROM of left knee M25.662    2. Antalgic gait R26.89    3. Generalized muscle weakness M62.81    4. Acute postoperative pain of left knee G89.18     M25.562        Assessment:   Patient was recently hospitalized due to GI issues and has been non compliant with HEP. She went to Dr. Kelly and received a second opinion on her knee post operatively and appeared happy and encouraged her knee is progressing normally- aside from her continued pain and reports of leg spasms. Pt presented with decreased knee ROM pre treatment which improved post treatment. Plan to cont PT and progress into balance ex's next visit.      Goal Status:  Pt. will demonstrate/verbalize independence in self-management of condition in : 2 weeks  Pt. will be independent with home exercise program in : 2 weeks    Pt will: display L knee PROM extension <5 degrees and flexion >115 degrees to improve ambulation and ROM in 12 weeks  Pt will: ascend/descend stairs with reciprocal patternw with 1 HR to improve stars in her home in 12 weeks  Pt will: walk >15' on outdoor surfaces without AD without increased pain to walk in her garden in 12 weeks  Pt will: stand >15' without AD and without breaks to perform household chores: cooking, cleaning in 12 weeks      Plan / Patient Education:     Continue with initial plan of care.  Progress with home program as tolerated.   Next visit:  Trial standing TKE  Go over new exercises.    Subjective:     Pain Ratin/10  Patient reports she was in the ED for 2 days under observation due to bloody stools. She is starting a new muscle relaxant.   Saw Dr. Kelly at [facility] last week for a second opinion  Noticed the \"kicking\" of her leg was better last night - " "less violent, but still waking her up at night.    Objective:     L knee ROM:  Lacking 7 degrees extension today post-treatment  Flexion 121 degrees post treatment     Treatment Today     TREATMENT MINUTES COMMENTS   Evaluation     Self-care/ Home management     Manual therapy 16 Grade II-III posterior glide to L femur -extension mobilization with oscillations     MFR x 5' with CFM to L quad distally at end range knee flx, prolonged stretch.    Neuromuscular Re-education     Therapeutic Activity     Therapeutic Exercises 24 Nu-step WL 5, 6:00.    Exercises:  Exercise #1: Heel slides 5x - measured today  Comment #1: Heel prop : Reviewed again  Exercise #2: Quad set - NP  Comment #2: Quad set with SLR x 10 mild lag  Exercise #3: LAQ at EOB, allowed pt to \"swing\" it as this feels relieving 3'  Comment #3: Mini squat 10x good form, no favoring  Exercise #4: Hamstring stretch on table 30\"  Comment #4: Toe/heel raises 10x  Exercise #5: Standing hip abduction 10x cynthia many cues for form  Exercise #6: Standing hamstring curl 10x  Comment #6: Reviewed heel prop for home, pt could not remember what to do  Exercise #7: Standing TKE ball on wall 10x 10\"  Exercise #8: forward mini lunges   Comment #8: x10 - no UE support, cues for slow lowering and weight shift backwards   Exercise #9: forward step ups on 4' step- cues for straightening knee completely   Comment #9: x10  Exercise #10: sit to stand   Comment #10: x10       Gait training     Modality__________________                Total 40    Blank areas are intentional and mean the treatment did not include these items.       Rabia Dean, PT, DPT  6/24/2019  "

## 2021-05-30 ENCOUNTER — RECORDS - HEALTHEAST (OUTPATIENT)
Dept: ADMINISTRATIVE | Facility: CLINIC | Age: 76
End: 2021-05-30

## 2021-05-30 NOTE — PROGRESS NOTES
[facility] Daily Progress     Patient Name: Anna Neal  Date: 7/25/2019  Visit #: 16/17  PTA visit #:  1  Referral Diagnosis: left total knee replacement  Referring provider: Adelaide Rubio CNP  Visit Diagnosis:     ICD-10-CM    1. Decreased ROM of left knee M25.662    2. Antalgic gait R26.89    3. Generalized muscle weakness M62.81    4. Acute postoperative pain of left knee G89.18     M25.562        Assessment:     Anna is s/p TKA on 3/19/19 and had complications post-op of LLE pain and spasms at night which was diagnosed as hemiballismus by neurology.  Today pt reports sleeping much better since starting a new medication for the hemiballismus.  She is only waking about 1x a week with spasms.  She reports ongoing left knee soreness and swelling which is still visible on exam today.  Her ROM is WFL, she is still lacking terminal knee extension but this has continued to improve slowly.  Her left knee flexion is functional at 120.  She demonstrates little to no gait deviations with normal gait pattern, stepping over obstacles, and stairs.  She does have antalgic gait after sitting for a short time, which resolves shortly.  Balance is normal BLE today with SLS.  She is compliant with a HEP and has written handouts and pictures that she follows.  We will review these at the last session.      Goal Status:  Pt. will demonstrate/verbalize independence in self-management of condition in : 2 weeks MET  Pt. will be independent with home exercise program in : 2 weeks MET  Pt will: display L knee PROM extension <5 degrees and flexion >115 degrees to improve ambulation and ROM in 12 weeks MET  Pt will: ascend/descend stairs with reciprocal patternw with 1 HR to improve stars in her home in 12 weeks MET  Pt will: walk >15' on outdoor surfaces without AD without increased pain to walk in her garden in 12 weeks MET  Pt will: stand >15' without AD and without breaks to perform household chores:  "cooking, cleaning in 12 weeks Progressed      Plan / Patient Education:     Continue with initial plan of care.  Progress with home program as tolerated.   Review leg press, alternative TRX squats/assisted squats at gym, biking for home.  Review HEP and DC.    Subjective:     Pain Ratin-5/10    Her leg is waking her up about once a week.  This is significantly improved.    Pt reports she got a diagnosis of hemiballismus from the neurologist and taking levadopa.    Objective:     L knee ROM:  Lacking 3 degrees extension today pre-treatment  Flexion 120 degrees pre-treatment    Gait: No AD, equal step length, good step clearance, good heel strike  Stairs: Alternating ascend/descend with 1 railing, no pain, even and controlled lowering cynthia  SLS: 30\" each leg no LOB    Exercises performed today:  Standup Bike 4' L1  - TRX squats NP today  - TRX squats NP today  - SLUMP SLRs Reviewed verbally  - Quad stretch prone 30\" x 2  - Mini lunges and reverse lunges onto BOSU 10x each foot - mildly unstable  - Pt education:  Pt has questions about right knee as well since it is \"bone on bone\".  Reviewed ongoing self-care would likely include biking, leg press, to maintain cynthia knee strength without pain    Home exercises to continue 19:   - Straight leg raise (lying on your back) x 10-15 reps  - Sidelying leg raise (lying on your side) x 10-15 reps  - Bridges x 5 second hold x 10-15 reps   - Towel stretch for hamstring/calf stretch x 30 second hold, 2-3 times  o Continue heel prop when sitting during the day  o Continue heel slides during the day  - Standing double leg squats x 5 second hold x 10-15 reps **equal weight between both legs  - Standing lunges x 10-15 on both legs   - Side steps with green band   - Single leg balance x 30 seconds x 2-3 reps on both legs     Treatment Today     TREATMENT MINUTES COMMENTS   Evaluation     Self-care/ Home management     Manual therapy     Neuromuscular Re-education     Therapeutic " Activity     Therapeutic Exercises 30    Gait training     Modality__________________                Total 30    Blank areas are intentional and mean the treatment did not include these items.       Latha Lambert, PT, DPT  7/25/2019

## 2021-05-30 NOTE — PROGRESS NOTES
"  [facility] Daily Progress     Patient Name: Anna Neal  Date: 2019  Visit #:   PTA visit #:  1  Referral Diagnosis: left total knee replacement  Referring provider: Adelaide Rubio CNP  Visit Diagnosis:     ICD-10-CM    1. Decreased ROM of left knee M25.662    2. Antalgic gait R26.89    3. Generalized muscle weakness M62.81    4. Acute postoperative pain of left knee G89.18     M25.562        Assessment:   Patient is managing symptoms and pain overall better today's date. She was able to cut her grass without significant increase in pain. Her \"leg kicking\" at night is also less frequent resulting in improved sleep. HEP was updated today and pt was given a list of ex's to focus on, as she has difficulty organizing/tracking her current HEP.    She is appropriate for PT 1x/week cont balance/gait progression.    Goal Status:  Pt. will demonstrate/verbalize independence in self-management of condition in : 2 weeks (met)  Pt. will be independent with home exercise program in : 2 weeks (met)  Pt will: display L knee PROM extension <5 degrees and flexion >115 degrees to improve ambulation and ROM in 12 weeks (partially met, flexion goal met)  Pt will: ascend/descend stairs with reciprocal patternw with 1 HR to improve stars in her home in 12 weeks (partially met)  Pt will: walk >15' on outdoor surfaces without AD without increased pain to walk in her garden in 12 weeks (not met)  Pt will: stand >15' without AD and without breaks to perform household chores: cooking, cleaning in 12 weeks (not met)      Plan / Patient Education:     Continue with initial plan of care.  Progress with home program as tolerated.   Cont PT 1x/week for 4 weeks.    Subjective:     Pain Ratin/10  Patient reports she cut her grass over the weekend-and is feeling sore overall today, but no significant increase in pain.  She is sleeping better. No longer \"kicking\" her leg at night. Will stay on her current " medication - roboxin as this seems to be helping.     Objective:     L knee ROM:  Lacking 7 degrees extension today post-treatment  Flexion 119 degrees post treatment     Home exercises to continue 7/2/19:   - Straight leg raise (lying on your back) x 10-15 reps  - Sidelying leg raise (lying on your side) x 10-15 reps  - Bridges x 5 second hold x 10-15 reps   - Towel stretch for hamstring/calf stretch x 30 second hold, 2-3 times  o Continue heel prop when sitting during the day  o Continue heel slides during the day  - Standing double leg squats x 5 second hold x 10-15 reps **equal weight between both legs  - Standing lunges x 10-15 on both legs   - Side steps with green band   - Single leg balance x 30 seconds x 2-3 reps on both legs     Treatment Today     TREATMENT MINUTES COMMENTS   Evaluation     Self-care/ Home management     Manual therapy     Neuromuscular Re-education     Therapeutic Activity     Therapeutic Exercises 32 Nu-step WL 5, 6:00 total. HEP reviewed and performed as above.   Discussed focus on these exercises to continue.    Gait training     Modality__________________                Total 32    Blank areas are intentional and mean the treatment did not include these items.       Rabia Dean, PT, DPT  7/2/2019

## 2021-05-30 NOTE — PROGRESS NOTES
"  [facility] Daily Progress     Patient Name: Anna Neal  Date: 2019  Visit #: 15/16  PTA visit #:  1  Referral Diagnosis: left total knee replacement  Referring provider: Adelaide Rubio CNP  Visit Diagnosis:     ICD-10-CM    1. Decreased ROM of left knee M25.662    2. Antalgic gait R26.89    3. Generalized muscle weakness M62.81    4. Acute postoperative pain of left knee G89.18     M25.562        Assessment:     Patient is managing symptoms and pain overall better today's date. She was able to cut her grass without significant increase in pain. Her \"leg kicking\" at night is also less frequent resulting in improved sleep. HEP was updated today and pt was given a list of ex's to focus on, as she has difficulty organizing/tracking her current HEP.    She is appropriate for PT 1x/week cont balance/gait progression.    Today pt is more sore and reports increased swelling since mowing her lawn yesterday.    Goal Status:  Pt. will demonstrate/verbalize independence in self-management of condition in : 2 weeks (met)  Pt. will be independent with home exercise program in : 2 weeks (met)  Pt will: display L knee PROM extension <5 degrees and flexion >115 degrees to improve ambulation and ROM in 12 weeks (partially met, flexion goal met)  Pt will: ascend/descend stairs with reciprocal patternw with 1 HR to improve stars in her home in 12 weeks (partially met)  Pt will: walk >15' on outdoor surfaces without AD without increased pain to walk in her garden in 12 weeks (not met)  Pt will: stand >15' without AD and without breaks to perform household chores: cooking, cleaning in 12 weeks (not met)      Plan / Patient Education:     Continue with initial plan of care.  Progress with home program as tolerated.   Cont PT 1x/week for 4 weeks.    Subjective:     Pain Ratin/10  Patient reports she cut her grass again and is very sore.  She had to ice a lot last night and this did help some.  She is " tearful when talking about it.    Pt reports she got a diagnosis of hemiballismus from the neurologist and taking levadopa.    Objective:     L knee ROM:  Lacking 7 degrees extension today post-treatment  Flexion 120 degrees post treatment     Exercises performed today:  NuStep L5, 5'  - Heel slides 10x  - SLR L 15x  - Sidelying SLR 15x cynthia  - Clamshells 15x cynthia  - SLUMP Sliders 20x cynthia    Home exercises to continue 7/2/19:   - Straight leg raise (lying on your back) x 10-15 reps  - Sidelying leg raise (lying on your side) x 10-15 reps  - Bridges x 5 second hold x 10-15 reps   - Towel stretch for hamstring/calf stretch x 30 second hold, 2-3 times  o Continue heel prop when sitting during the day  o Continue heel slides during the day  - Standing double leg squats x 5 second hold x 10-15 reps **equal weight between both legs  - Standing lunges x 10-15 on both legs   - Side steps with green band   - Single leg balance x 30 seconds x 2-3 reps on both legs     Treatment Today     TREATMENT MINUTES COMMENTS   Evaluation     Self-care/ Home management     Manual therapy     Neuromuscular Re-education     Therapeutic Activity     Therapeutic Exercises 30 Nu-step WL 5, 6:00 total. Added nerve glides to HEP    Gait training     Modality__________________                Total 30    Blank areas are intentional and mean the treatment did not include these items.       Latha Lambert, PT, DPT  7/11/2019

## 2021-05-30 NOTE — PROGRESS NOTES
June 27, 2019        Patient: Anna Neal   MR Number: [id number]   YOB: 1945   Date of Visit: 6/27/2019       Dear Dr. Isra Dove,     As you may recall, we have been seeing Anna Neal for Physical Therapy for total knee replacement.    For therapy to continue, Medicare and/or Medicaid requires periodic physician review of the treatment plan. Please review the attached summary of the patient's progress and our plan for continued therapy, and verify  that you agree therapy should continue by signing this document and sending it back to our office.    Plan of Care  Authorization / Certification Start Date: 06/27/19  Authorization / Certification End Date: 08/26/19  Authorization / Certification Number of Visits: up to 16-17 visits since IE  Communication with: Referral Source  Patient Related Instruction: Nature of Condition;Treatment plan and rationale;Self Care instruction;Basis of treatment;Body mechanics;Posture;Precautions;Next steps  Times per Week: 1x/week   Number of Weeks: up to 12 weeks  Number of Visits: up to 16-17 visits since SOC  Discharge Planning: to I HEP  Therapeutic Exercise: ROM;Stretching;Strengthening  Neuromuscular Reeducation: kinesio tape;posture;balance/proprioception;TNE;postural restoration;core  Manual Therapy: soft tissue mobilization;myofascial release;joint mobilization;muscle energy  Other Plan #1: therapeutic activity       Goals  Pt. will demonstrate/verbalize independence in self-management of condition in : 2 weeks  Pt. will be independent with home exercise program in : 2 weeks    Pt will: display L knee PROM extension <5 degrees and flexion >115 degrees to improve ambulation and ROM in 12 weeks  Pt will: ascend/descend stairs with reciprocal patternw with 1 HR to improve stars in her home in 12 weeks  Pt will: walk >15' on outdoor surfaces without AD without increased pain to walk in her garden in 12 weeks  Pt will: stand >15' without AD and without  "breaks to perform household chores: cooking, cleaning in 12 weeks            If you have any questions or concerns, please don't hesitate to call.    Sincerely,      Rabia Dean, PT        For Medicare/MA patients:      Physician recommendation:     ___ Follow therapist's recommendation        ___ Modify therapy      I certify the need for these services furnished within this plan and while under my care.    Referring Provider's  Printed Name:   _____________________________________________    Referring Provider's signature:  __________________________________________________  Date/time:    ________________        After signing this form, please return to the fax number.  Thank you.        [facility] Daily Progress     Patient Name: Anna Neal  Date: 6/27/2019  Visit #: 12/16  PTA visit #:  1  Referral Diagnosis: left total knee replacement  Referring provider: Adelaide Rubio, CNP  Visit Diagnosis:     ICD-10-CM    1. Decreased ROM of left knee M25.662    2. Antalgic gait R26.89    3. Generalized muscle weakness M62.81    4. Acute postoperative pain of left knee G89.18     M25.562        Assessment:   Patient has been seen for 12 visits since initial evaluation. Goals are partially met at this time. Patient has had multiple hospitalizations - with most recent approximately a week ago - which have likely contributed to her slow progress in PT. Pt's knee flexion is WNL, but she continues to lack extension and continues to use SPC. Patient's c/o knee \"kicking and thrashing\" at night is improving with new medication. Her pain has been poorly managed until this point in therapy, but she is less fixed on her pain today's date. Pt is not interested in pursuing psychotherapy but would likely benefit from this.    She is appropriate for PT 1x/week for 4 more weeks to cont balance/gait progression.     Goal Status:  Pt. will demonstrate/verbalize independence in self-management of condition in : 2 " "weeks (met)  Pt. will be independent with home exercise program in : 2 weeks (met)  Pt will: display L knee PROM extension <5 degrees and flexion >115 degrees to improve ambulation and ROM in 12 weeks (partially met, flexion goal met)  Pt will: ascend/descend stairs with reciprocal patternw with 1 HR to improve stars in her home in 12 weeks (partially met)  Pt will: walk >15' on outdoor surfaces without AD without increased pain to walk in her garden in 12 weeks (not met)  Pt will: stand >15' without AD and without breaks to perform household chores: cooking, cleaning in 12 weeks (not met)      Plan / Patient Education:     Continue with initial plan of care.  Progress with home program as tolerated.   Cont PT 1x/week for 4 weeks. Plan to reassess HEP and eliminate ex's which are no longer challenging for pt.    Subjective:     Pain Ratin/10  Patient reports her leg \"spasms\" are improving at night and she is kicking her leg less with her new medication.   She uses cane for long distances - would like to eventually stop using her cane. Has been more compliant with HEP since last visit.     Objective:     L knee ROM:  Lacking 7 degrees extension today post-treatment  Flexion 120 degrees post treatment     Treatment Today     TREATMENT MINUTES COMMENTS   Evaluation     Self-care/ Home management     Manual therapy 17 Grade II-III posterior glide to L femur -extension mobilization with oscillations     MFR x 5' with CFM to L quad distally at end range knee flx, prolonged stretch.    Neuromuscular Re-education     Therapeutic Activity     Therapeutic Exercises 15 Upright bike, WL 3, 6:00 total.     Exercises:  Exercise #1: Heel slides 5x - measured today  Comment #1: Heel prop : Reviewed again  Exercise #2: Quad set - NP  Comment #2: Quad set with SLR x 10 mild lag  Exercise #3: LAQ at EOB, allowed pt to \"swing\" it as this feels relieving 3'  Comment #3: Mini squat 10x good form, no favoring  Exercise #4: Hamstring " "stretch on table 30\"  Comment #4: Toe/heel raises 10x  Exercise #5: Standing hip abduction 10x cynthia many cues for form  Exercise #6: Standing hamstring curl 10x  Comment #6: Reviewed heel prop for home, pt could not remember what to do  Exercise #7: Standing TKE ball on wall 10x 10\"  Exercise #8: forward mini lunges   Comment #8: x10 - no UE support, cues for slow lowering and weight shift backwards   Exercise #9: forward step ups on 4' step- cues for straightening knee completely   Comment #9: x10  Exercise #10: sit to stand   Comment #10: x10       Gait training     Modality__________________                Total 32    Blank areas are intentional and mean the treatment did not include these items.       Rabia Dean, PT, DPT  6/27/2019  "

## 2021-05-30 NOTE — PROGRESS NOTES
"  [facility] Daily Progress     Patient Name: Anna Neal  Date: 2019  Visit #: 15/16  PTA visit #:  1  Referral Diagnosis: left total knee replacement  Referring provider: Adelaide Rubio CNP  Visit Diagnosis:     ICD-10-CM    1. Decreased ROM of left knee M25.662    2. Antalgic gait R26.89    3. Generalized muscle weakness M62.81    4. Acute postoperative pain of left knee G89.18     M25.562        Assessment:     Anna continues to report high levels of pain and discomfort.  Objectively, her knee ROM continues to improve and she is tolerating higher level functional strengthening activities.  She will likely be ready to DC in her remaining 2 sessions.  Compliance with HEP seems variable and pt has recall of some of her exercises though sometimes requires verbal cues.    Goal Status:  Pt. will demonstrate/verbalize independence in self-management of condition in : 2 weeks (met)  Pt. will be independent with home exercise program in : 2 weeks (met)  Pt will: display L knee PROM extension <5 degrees and flexion >115 degrees to improve ambulation and ROM in 12 weeks (partially met, flexion goal met)  Pt will: ascend/descend stairs with reciprocal patternw with 1 HR to improve stars in her home in 12 weeks (partially met)  Pt will: walk >15' on outdoor surfaces without AD without increased pain to walk in her garden in 12 weeks (not met)  Pt will: stand >15' without AD and without breaks to perform household chores: cooking, cleaning in 12 weeks (not met)      Plan / Patient Education:     Continue with initial plan of care.  Progress with home program as tolerated. Cont PT 1x/week    Subjective:     Pain Ratin/10  Patient continues to report high levels of pain and \"miserable\".  Not sleeping well, waking up in the morning and her leg is stiff, doesn't warm up until noon.    Pt reports she got a diagnosis of hemiballismus from the neurologist and taking levadopa.    Objective:     L " "knee ROM:  Lacking 3 degrees extension today post-treatment  Flexion 120 degrees post treatment     Exercises performed today: Progressed to more standing/functional exercises today  Standup Bike 4' L1  - TRX squats 10x  - TRX squats 1 foot on foam 10x each  - SLUMP SLRs 10x each cues for ankle DF  - Quad stretch prone 30\" x 2  - Mini lunges and reverse lunges onto BOSU 10x each foot - mildly unstable    Home exercises to continue 7/2/19:   - Straight leg raise (lying on your back) x 10-15 reps  - Sidelying leg raise (lying on your side) x 10-15 reps  - Bridges x 5 second hold x 10-15 reps   - Towel stretch for hamstring/calf stretch x 30 second hold, 2-3 times  o Continue heel prop when sitting during the day  o Continue heel slides during the day  - Standing double leg squats x 5 second hold x 10-15 reps **equal weight between both legs  - Standing lunges x 10-15 on both legs   - Side steps with green band   - Single leg balance x 30 seconds x 2-3 reps on both legs     Treatment Today     TREATMENT MINUTES COMMENTS   Evaluation     Self-care/ Home management     Manual therapy     Neuromuscular Re-education     Therapeutic Activity     Therapeutic Exercises 30     Gait training     Modality__________________                Total 30    Blank areas are intentional and mean the treatment did not include these items.       Latha Lambert, PT, DPT  7/18/2019  "

## 2021-05-31 NOTE — PROGRESS NOTES
[facility] Daily Progress     Patient Name: Anna Neal  Date: 8/1/2019  Visit #: 17/17  PTA visit #:  1  Referral Diagnosis: left total knee replacement  Referring provider: Adelaide Rubio CNP  Visit Diagnosis:     ICD-10-CM    1. Decreased ROM of left knee M25.662    2. Antalgic gait R26.89    3. Generalized muscle weakness M62.81    4. Acute postoperative pain of left knee G89.18     M25.562        Assessment:     Anna is s/p TKA on 3/19/19 and had complications post-op of LLE pain and spasms at night which was diagnosed as hemiballismus by neurology.  Today pt reports sleeping better since starting a new medication for the hemiballismus.  She is only waking about 1x a week with spasms.  She reports ongoing left knee soreness and swelling which is still visible on exam today.  Her ROM is WFL, she is still lacking terminal knee extension but this has continued to improve slowly.  Her left knee ROM is -3-125.    She demonstrates little to no gait deviations with normal gait pattern, stepping over obstacles, and stairs.  She does have antalgic gait after sitting for a short time, which resolves shortly.  Balance is normal BLE today with SLS.  She is compliant with a HEP and has written handouts and pictures that she follows.  She has met her PT goals and we will discharge her at this time.      Goal Status:  Pt. will demonstrate/verbalize independence in self-management of condition in : 2 weeks MET  Pt. will be independent with home exercise program in : 2 weeks MET  Pt will: display L knee PROM extension <5 degrees and flexion >115 degrees to improve ambulation and ROM in 12 weeks MET  Pt will: ascend/descend stairs with reciprocal patternw with 1 HR to improve stars in her home in 12 weeks MET  Pt will: walk >15' on outdoor surfaces without AD without increased pain to walk in her garden in 12 weeks MET  Pt will: stand >15' without AD and without breaks to perform household chores:  "cooking, cleaning in 12 weeks MET      Plan / Patient Education:     DC with HEP    Subjective:     Pain Ratin-5/10    Pt reports very sore knee, improved mobility, no problem doing lots of stairs at her house.    Objective:     L knee ROM:  Lacking 3 degrees extension today pre-treatment  Flexion 125 post-treatment    Clears 8\" hurdles evenly without antalgic gait  Demonstrates stairs ascending/descending with reciprocal gait pattern    Gait: No AD, equal step length, good step clearance, good heel strike  Stairs: Alternating ascend/descend with 1 railing, no pain, even and controlled lowering cynthia  SLS: 30\" each leg no LOB    Exercises performed today:  Standup Bike 4' L1  - TRX squats NP today  - TRX squats NP today  - TKE standing L3 band  - SLUMP SLRs Reviewed verbally  - Quad stretch prone 30\" x 2  - Mini lunges and reverse lunges onto BOSU 10x each foot - mildly unstable      Home exercises to continue 19:   - Straight leg raise (lying on your back) x 10-15 reps  - Sidelying leg raise (lying on your side) x 10-15 reps   - Bridges x 5 second hold x 10-15 reps   - Towel stretch for hamstring/calf stretch x 30 second hold, 2-3 times  o Continue heel prop when sitting during the day  o Continue heel slides during the day  - Standing double leg squats x 5 second hold x 10-15 reps **equal weight between both legs  - Standing lunges x 10-15 on both legs   - Side steps with green band   - Single leg balance x 30 seconds x 2-3 reps on both legs     Treatment Today     TREATMENT MINUTES COMMENTS   Evaluation     Self-care/ Home management     Manual therapy     Neuromuscular Re-education     Therapeutic Activity     Therapeutic Exercises 30    Gait training     Modality__________________                Total 30    Blank areas are intentional and mean the treatment did not include these items.       Latha Lambert, PT, DPT  2019  "

## 2021-06-02 ENCOUNTER — RECORDS - HEALTHEAST (OUTPATIENT)
Dept: ADMINISTRATIVE | Facility: CLINIC | Age: 76
End: 2021-06-02

## 2021-06-02 VITALS — WEIGHT: 171.8 LBS | BODY MASS INDEX: 29.49 KG/M2

## 2021-06-02 VITALS — BODY MASS INDEX: 29.01 KG/M2 | WEIGHT: 169 LBS

## 2021-06-02 VITALS — HEIGHT: 64 IN | BODY MASS INDEX: 28.58 KG/M2 | WEIGHT: 167.4 LBS

## 2021-06-03 VITALS — HEIGHT: 65 IN | BODY MASS INDEX: 26.66 KG/M2 | WEIGHT: 160 LBS

## 2021-06-16 PROBLEM — R42 DIZZINESS: Status: ACTIVE | Noted: 2017-11-08

## 2021-06-16 PROBLEM — R19.7 BLOODY DIARRHEA: Status: ACTIVE | Noted: 2019-06-22

## 2021-06-16 PROBLEM — M19.90 DJD (DEGENERATIVE JOINT DISEASE): Status: ACTIVE | Noted: 2019-03-19

## 2021-06-16 PROBLEM — Z96.652 HX OF TOTAL KNEE REPLACEMENT, LEFT: Status: ACTIVE | Noted: 2019-03-25

## 2021-06-16 PROBLEM — R10.9 ABDOMINAL CRAMPING: Status: ACTIVE | Noted: 2019-06-22

## 2021-06-16 PROBLEM — K62.5 RECTAL BLEEDING: Status: ACTIVE | Noted: 2019-06-21

## 2021-06-16 PROBLEM — R26.81 UNSTEADY GAIT: Status: ACTIVE | Noted: 2017-11-08

## 2021-06-16 PROBLEM — K52.9 COLITIS: Status: ACTIVE | Noted: 2019-06-21

## 2021-06-16 PROBLEM — K21.9 GASTROESOPHAGEAL REFLUX DISEASE WITHOUT ESOPHAGITIS: Status: ACTIVE | Noted: 2019-06-22

## 2021-06-17 NOTE — PATIENT INSTRUCTIONS - HE
Patient Instructions by Latha Lambert PT at 5/9/2019  9:00 AM     Author: Latha Lambert PT Service: -- Author Type: Physical Therapist    Filed: 5/9/2019  9:33 AM Encounter Date: 5/9/2019 Status: Signed    : Latha Lambert PT (Physical Therapist)        DIAPHRAMATIC BREATHING    While lying down on your back, place one hand on your breast bone and one hand on your abdomen near your navel.     Slowly take a deep breath in and focus on trying to get your hand on your stomach rise while the hand on your breast bone remains still.     As you breathe in, the hand on your stomach should rise.  When you breath out, the hand on your stomach should lower.

## 2021-06-17 NOTE — PATIENT INSTRUCTIONS - HE
Patient Instructions by Nurys Travis PTA at 6/17/2019  9:00 AM     Author: Nurys Travis PTA Service: -- Author Type: Physical Therapist Assistant    Filed: 6/17/2019  9:28 AM Encounter Date: 6/17/2019 Status: Signed    : Nurys Travis PTA (Physical Therapist Assistant)              MONSTER WALK    Walk forwards, backwards and side step both ways with knees slightly bent, keeping tension in the resistance band.

## 2021-06-17 NOTE — PATIENT INSTRUCTIONS - HE
Patient Instructions by Latha Lambert PT at 7/11/2019 12:00 PM     Author: Latha Lambert PT Service: -- Author Type: Physical Therapist    Filed: 7/11/2019 12:27 PM Encounter Date: 7/11/2019 Status: Signed    : Latha Lambert PT (Physical Therapist)           Lie on your back   - Grab behind your knee slightly pulling your knee to your chest (you can use a towel if needed)   - Straighten the leg as far as you can, then point your toe up towards your head. Get a nice easy stretch. Do not hold   - Bring the leg down   Repeat 10-20 times on each leg

## 2021-06-19 NOTE — LETTER
Letter by Sabi Gates CNP at      Author: Sabi Gates CNP Service: -- Author Type: --    Filed:  Encounter Date: 3/25/2019 Status: (Other)         Patient: Anna Neal   MR Number: [id number]   YOB: 1945   Date of Visit: 3/25/2019       [facility]      NAME:  Anna Neal             :  1945    MRN: [id number]    CODE STATUS:  FULL CODE    FACILITY: [facility] [[id number]]       CHIEF COMPLAIN/REASON FOR VISIT:  Chief Complaint   Patient presents with   ? Review Of Multiple Medical Conditions       HISTORY OF PRESENT ILLNESS: Anna Neal is a 74 y.o. female being [facility]  Left sided due to osteorathritis. She has h/o djd. Seen today, left knee is red and swollen. She reports she twisted her knee transferring and swelling increased. Nursing staff were directed to update ortho, we can obtain xrays or US here or if they would prefer to see her in the office. Also reports having a vaginal candidis.   Pain is stable. On Xarelto for anticoagulation, fading bruising is noted to her left swollen knee.    Allergies   Allergen Reactions   ? Mesalamine Rash   ? Nsaids (Non-Steroidal Anti-Inflammatory Drug) Anaphylaxis and Hives   ? Tree Nut Anaphylaxis   ? Hydrocod-Cpm-Pe-Acetaminophen Unknown     Headache   ? Latex      Added based on information entered during case entry, please review and add reactions, type, and severity as needed   ? Mometasone Furoate Unknown     epistaxis   ? Nortriptyline Unknown   ? Paroxetine Other (See Comments)     tired   ? Penicillins      Local swelling with injection at age 16   ? Sulfamethoxazole-Trimethoprim Hives   ? Trazodone Other (See Comments)     VERY TIRED  RLS;  JUMPED OUT OF MY SKIN;   ? Latex Rash     unknown   :     Current Outpatient Medications   Medication Sig   ? polyethylene glycol (MIRALAX) 17 gram packet Take 17 g by  mouth daily.   ? acetaminophen (TYLENOL) 500 MG tablet Take 2 tablets (1,000 mg total) by mouth 3 (three) times a day.   ? albuterol (PROAIR HFA;PROVENTIL HFA;VENTOLIN HFA) 90 mcg/actuation inhaler Inhale 2 puffs every 6 (six) hours as needed for wheezing.   ? ascorbic acid, vitamin C, (VITAMIN C) 500 MG tablet Take 500 mg by mouth daily.   ? cartilage/collagen/bor/hyalur (MOVE FREE ULTRA, BORON, ORAL) Take 1 capsule by mouth daily.   ? cholecalciferol, vitamin D3, 1,000 unit tablet Take 5,000 Units by mouth daily.          ? EPINEPHrine (ADRENALIN) 1 mg/mL (1 mL) injection Inject into the shoulder, thigh, or buttocks as needed.          ? estradiol (ESTRACE) 0.01 % (0.1 mg/gram) vaginal cream Insert 2 g into the vagina once a week.          ? famotidine (PEPCID) 20 MG tablet Take 20 mg by mouth daily.   ? fexofenadine (ALLEGRA) 180 MG tablet Take 180 mg by mouth daily.   ? hydrOXYzine pamoate (VISTARIL) 25 MG capsule Take 1 capsule (25 mg total) by mouth every 6 (six) hours as needed (pain, muscle spasms, itching, anxiety). Hold for sedation. (Patient taking differently: Take 25 mg by mouth 3 (three) times a day. Hold for sedation.      )   ? lactase (LACTAID) 3,000 unit tablet Take 3,000 Units by mouth as needed.   ? multivitamin with minerals (THERA-M) 9 mg iron-400 mcg Tab tablet Take 1 tablet by mouth daily.   ? mv,krysta,iron,mn/folic acid/chol (HAIR-SKIN-NAILS, PABA, ORAL) Take 1 capsule by mouth daily.   ? nicotine (NICODERM CQ) 7 mg/24 hr Place 1 patch on the skin daily as needed for smoking cessation.   ? omalizumab (XOLAIR) 150 mg injection Inject under the skin every 14 (fourteen) days.   ? oxyCODONE (ROXICODONE) 5 MG immediate release tablet Take 1-2 tablets (5-10 mg total) by mouth every 4 (four) hours as needed. Take 1 tab for pain 1-6/10, take 2 tabs for pain 7-10/10.   ? rivaroxaban (XARELTO) 10 mg tablet Take 1 tablet (10 mg total) by mouth daily with supper.   ? senna-docusate (PERICOLACE) 8.6-50 mg  tablet Take 1 tablet by mouth 2 (two) times a day as needed for constipation.   ? triamcinolone (KENALOG) 0.1 % cream Apply 1 application topically 2 (two) times a day as needed.         REVIEW OF SYSTEMS:    Currently, no fever, chills, or rigors. Does not have any visual or hearing problems. Denies any chest pain, headaches, palpitations, lightheadedness, dizziness, shortness of breath, or cough. Appetite is good. Denies any GERD symptoms. Denies any difficulty with swallowing, nausea, or vomiting.  Denies any abdominal pain, diarrhea or constipation. Denies any urinary symptoms. No insomnia. No active bleeding. No rash.       PHYSICAL EXAMINATION:  Vitals:    03/25/19 1635   BP: 147/84   Pulse: 81   Temp: 98.4  F (36.9  C)   Weight: 171 lb 12.8 oz (77.9 kg)         GENERAL: Awake, Alert, oriented x3, not in any form of acute distress, answers questions appropriately, follows simple commands, conversant  HEENT: Head is normocephalic with normal hair distribution. No evidence of trauma. Ears: No acute purulent discharge. Eyes: Conjunctivae pink with no scleral jaundice. Nose: Normal mucosa and septum. NECK: Supple with no cervical or supraclavicular lymphadenopathy. Trachea is midline.   CHEST: No tenderness or deformity, no crepitus  LUNG: Clear to auscultation with good chest expansion. There are no crackles or wheezes, normal AP diameter.  BACK: No kyphosis of the thoracic spine. Symmetric, no curvature, ROM normal, no CVA tenderness, no spinal tenderness   CVS: There is good S1  S2, there are no murmurs, rubs, gallops, or heaves, rhythm is regular.  ABDOMEN: Globular and soft, nontender to palpation, non distended, no masses, no organomegaly, good bowel sounds, no rebound or guarding, no peritoneal signs.   EXTREMITIES: Atraumatic. Left knee with limited range, knee with increased swelling and redness. no pedal edema, no cyanosis or clubbing, no calf tenderness, normal cap refill, no joint swelling.  SKIN: Warm  and dry, no erythema noted, no rashes or lesions.  NEUROLOGICAL: The patient is oriented to person, place and time. Strength and sensation are grossly intact. Face is symmetric.            LABS:    Lab Results   Component Value Date    WBC 6.0 03/19/2019    HGB 13.3 03/21/2019    HCT 43.0 03/19/2019    MCV 89 03/19/2019     03/19/2019       Results for orders placed or performed during the hospital encounter of 11/08/17   Basic Metabolic Panel   Result Value Ref Range    Sodium 138 136 - 145 mmol/L    Potassium 4.3 3.5 - 5.0 mmol/L    Chloride 104 98 - 107 mmol/L    CO2 27 22 - 31 mmol/L    Anion Gap, Calculation 7 5 - 18 mmol/L    Glucose 133 (H) 70 - 125 mg/dL    Calcium 9.1 8.5 - 10.5 mg/dL    BUN 17 8 - 28 mg/dL    Creatinine 0.99 0.60 - 1.10 mg/dL    GFR MDRD Af Amer >60 >60 mL/min/1.73m2    GFR MDRD Non Af Amer 55 (L) >60 mL/min/1.73m2           Lab Results   Component Value Date    HGBA1C 7.0 (H) 02/26/2019     No results found for: [id number]  Lab Results   Component Value Date    VFZCBXBG17 926 (H) 11/09/2017       ASSESSMENT/PLAN:  1. Osteoarthritis, localized, knee    2. Weakness    3. Hx of total knee replacement, left      1.Left TKA/Weakness: XRAY and US to left knee per ortho, report abnormal results to them if present. Continu therapy as ordered, pain controlled, on oxycodone prn.    2. Reports vaginal candidas: Flagyl 150 mg po x 1 dose only.    3. POLST: Reviewed with pt and signed as full code per pt request.      Electronically signed by:  Sabi Gates CNP  This progress note was completed using Dragon software and there may be grammatical errors.      45 minutes spent of which greater than 25 minutes was face to face communication with the patient about above plan of care for TCU regime, her pain management program, f./u of knee imaging and her POLST wishes.

## 2021-06-19 NOTE — LETTER
Letter by Sabi Gates CNP at      Author: Sabi Gates CNP Service: -- Author Type: --    Filed:  Encounter Date: 3/28/2019 Status: (Other)         Patient: Anna Neal   MR Number: [id number]   YOB: 1945   Date of Visit: 3/28/2019       [facility]      NAME:  Anna Neal             :  1945    MRN: [id number]    CODE STATUS:  FULL CODE    FACILITY: [facility] [[id number]]       CHIEF COMPLAIN/REASON FOR VISIT:  Chief Complaint   Patient presents with   ? Review Of Multiple Medical Conditions       HISTORY OF PRESENT ILLNESS: Anna Neal is a 74 y.o. female being [facility]  Left sided due to osteorathritis. She has h/o DJD. She has been doingf well in therapy, left knee with reduced redness and swelling. She reports she  Pain is stable, has not been taking vistaril , we will dc.Oxycodone 5-10 mg po prn has been effective.  On Xarelto for anticoagulation, fading bruising is noted to her left swollen knee. Reports she feels as though she is progressing well.    Allergies   Allergen Reactions   ? Mesalamine Rash   ? Nsaids (Non-Steroidal Anti-Inflammatory Drug) Anaphylaxis and Hives   ? Tree Nut Anaphylaxis   ? Hydrocod-Cpm-Pe-Acetaminophen Unknown     Headache   ? Latex      Added based on information entered during case entry, please review and add reactions, type, and severity as needed   ? Mometasone Furoate Unknown     epistaxis   ? Nortriptyline Unknown   ? Paroxetine Other (See Comments)     tired   ? Penicillins      Local swelling with injection at age 16   ? Sulfamethoxazole-Trimethoprim Hives   ? Trazodone Other (See Comments)     VERY TIRED  RLS;  JUMPED OUT OF MY SKIN;   ? Latex Rash     unknown   :     Current Outpatient Medications   Medication Sig   ? acetaminophen (TYLENOL) 500 MG tablet Take 2 tablets (1,000 mg total) by mouth 3 (three) times a  day.   ? albuterol (PROAIR HFA;PROVENTIL HFA;VENTOLIN HFA) 90 mcg/actuation inhaler Inhale 2 puffs every 6 (six) hours as needed for wheezing.   ? ascorbic acid, vitamin C, (VITAMIN C) 500 MG tablet Take 500 mg by mouth daily.   ? cartilage/collagen/bor/hyalur (MOVE FREE ULTRA, BORON, ORAL) Take 1 capsule by mouth daily.   ? cholecalciferol, vitamin D3, 1,000 unit tablet Take 5,000 Units by mouth daily.          ? EPINEPHrine (ADRENALIN) 1 mg/mL (1 mL) injection Inject into the shoulder, thigh, or buttocks as needed.          ? estradiol (ESTRACE) 0.01 % (0.1 mg/gram) vaginal cream Insert 2 g into the vagina once a week.          ? famotidine (PEPCID) 20 MG tablet Take 20 mg by mouth daily.   ? fexofenadine (ALLEGRA) 180 MG tablet Take 180 mg by mouth daily.   ? lactase (LACTAID) 3,000 unit tablet Take 3,000 Units by mouth as needed.   ? multivitamin with minerals (THERA-M) 9 mg iron-400 mcg Tab tablet Take 1 tablet by mouth daily.   ? mv,krysta,iron,mn/folic acid/chol (HAIR-SKIN-NAILS, PABA, ORAL) Take 1 capsule by mouth daily.   ? nicotine (NICODERM CQ) 7 mg/24 hr Place 1 patch on the skin daily as needed for smoking cessation.   ? omalizumab (XOLAIR) 150 mg injection Inject under the skin every 14 (fourteen) days.   ? oxyCODONE (ROXICODONE) 5 MG immediate release tablet Take 1-2 tablets (5-10 mg total) by mouth every 4 (four) hours as needed. Take 1 tab for pain 1-6/10, take 2 tabs for pain 7-10/10.   ? polyethylene glycol (MIRALAX) 17 gram packet Take 17 g by mouth daily.   ? rivaroxaban (XARELTO) 10 mg tablet Take 1 tablet (10 mg total) by mouth daily with supper.   ? senna-docusate (PERICOLACE) 8.6-50 mg tablet Take 1 tablet by mouth 2 (two) times a day as needed for constipation.   ? triamcinolone (KENALOG) 0.1 % cream Apply 1 application topically 2 (two) times a day as needed.         REVIEW OF SYSTEMS:    Currently, no fever, chills, or rigors. Does not have any visual or hearing problems. Denies any chest  pain, headaches, palpitations, lightheadedness, dizziness, shortness of breath, or cough. Appetite is good. Denies any GERD symptoms. Denies any difficulty with swallowing, nausea, or vomiting.  Denies any abdominal pain, diarrhea or constipation. Denies any urinary symptoms. No insomnia. No active bleeding. No rash.       PHYSICAL EXAMINATION:  Vitals:    03/28/19 1431   BP: 120/75   Temp: 97.9  F (36.6  C)         GENERAL: Awake, Alert, oriented x3, not in any form of acute distress, answers questions appropriately, follows simple commands, conversant  HEENT: Head is normocephalic with normal hair distribution. No evidence of trauma. Ears: No acute purulent discharge. Eyes: Conjunctivae pink with no scleral jaundice. Nose: Normal mucosa and septum. NECK: Supple with no cervical or supraclavicular lymphadenopathy. Trachea is midline.   CHEST: No tenderness or deformity, no crepitus  LUNG: Clear to auscultation with good chest expansion. There are no crackles or wheezes, normal AP diameter.  BACK: No kyphosis of the thoracic spine. Symmetric, no curvature, ROM normal, no CVA tenderness, no spinal tenderness   CVS: There is good S1  S2, there are no murmurs, rubs, gallops, or heaves, rhythm is regular.  ABDOMEN: Globular and soft, nontender to palpation, non distended, no masses, no organomegaly, good bowel sounds, no rebound or guarding, no peritoneal signs.   EXTREMITIES: Atraumatic. Left knee with limited range, knee with some swelling and redness. no pedal edema, no cyanosis or clubbing, no calf tenderness, normal cap refill, no joint swelling.  SKIN: Warm and dry, no erythema noted, no rashes or lesions.  NEUROLOGICAL: The patient is oriented to person, place and time. Strength and sensation are grossly intact. Face is symmetric.            LABS:    Lab Results   Component Value Date    WBC 5.9 03/26/2019    HGB 13.7 03/26/2019    HCT 40.5 03/26/2019    MCV 92 03/26/2019     03/26/2019       Results for  "orders placed or performed in visit on 03/26/19   Basic Metabolic Panel   Result Value Ref Range    Sodium 140 136 - 145 mmol/L    Potassium 3.8 3.5 - 5.0 mmol/L    Chloride 104 98 - 107 mmol/L    CO2 29 22 - 31 mmol/L    Anion Gap, Calculation 7 5 - 18 mmol/L    Glucose 139 (H) 70 - 125 mg/dL    Calcium 9.9 8.5 - 10.5 mg/dL    BUN 16 8 - 28 mg/dL    Creatinine 0.89 0.60 - 1.10 mg/dL    GFR MDRD Af Amer >60 >60 mL/min/1.73m2    GFR MDRD Non Af Amer >60 >60 mL/min/1.73m2           Lab Results   Component Value Date    HGBA1C 7.0 (H) 02/26/2019     No results found for: [id number]  Lab Results   Component Value Date    [id number] 926 (H) 11/09/2017       ASSESSMENT/PLAN:  1. Primary osteoarthritis of left knee    2. Hx of total knee replacement, left      1.Left TKA/ and osteoarthritis\": no  report abnormal results to xray or ultrasound. . Continue therapy as ordered, pain controlled, on oxycodone prn.We will dc as per RN pt has not been utulizing thios medication.            Electronically signed by:  Sabi Gates CNP  This progress note was completed using Dragon software and there may be grammatical errors.               "

## 2021-06-19 NOTE — LETTER
Letter by Sabi Gates CNP at      Author: Sabi Gates CNP Service: -- Author Type: --    Filed:  Encounter Date: 3/29/2019 Status: (Other)         Patient: Anna Neal   MR Number: [id number]   YOB: 1945   Date of Visit: 3/29/2019     [facility]      NAME:  Anna Neal             :  1945  MRN: [id number]  CODE STATUS:  FULL CODE    VISIT TYPE: DISCHARGE SUMMARY  FACILYTY: [facility] [[id number]]                    PRIMARY CARE PROVIDER: Fady Christy MD    DISCHARGE DIAGNOSIS:      1. Hx of total knee replacement, left    2. Primary osteoarthritis of left knee         DISCHARGE MEDICATIONS:         Medication List           Accurate as of 3/29/19  1:49 PM. If you have any questions, ask your nurse or doctor.               CONTINUE taking these medications    acetaminophen 500 MG tablet  Commonly known as:  TYLENOL  Take 2 tablets (1,000 mg total) by mouth 3 (three) times a day.     albuterol 90 mcg/actuation inhaler  Commonly known as:  PROAIR HFA;PROVENTIL HFA;VENTOLIN HFA     ascorbic acid (vitamin C) 500 MG tablet  Commonly known as:  VITAMIN C     cholecalciferol (vitamin D3) 1,000 unit tablet     EPINEPHrine 1 mg/mL (1 mL) injection  Commonly known as:  ADRENALIN     estradiol 0.01 % (0.1 mg/gram) vaginal cream  Commonly known as:  ESTRACE     famotidine 20 MG tablet  Commonly known as:  PEPCID     fexofenadine 180 MG tablet  Commonly known as:  ALLEGRA     HAIR-SKIN-NAILS (PABA) ORAL     lactase 3,000 unit tablet  Commonly known as:  LACTAID     MOVE FREE ULTRA (BORON) ORAL     multivitamin with minerals 9 mg iron-400 mcg Tab tablet  Commonly known as:  THERA-M     nicotine 7 mg/24 hr  Commonly known as:  NICODERM CQ     omalizumab 150 mg injection  Commonly known as:  XOLAIR     oxyCODONE 5 MG immediate release tablet  Commonly known as:  ROXICODONE  Take 1-2 tablets (5-10 mg total) by mouth every 4 (four) hours as  needed. Take 1 tab for pain 1-6/10, take 2 tabs for pain 7-10/10.     polyethylene glycol 17 gram packet  Commonly known as:  MIRALAX     rivaroxaban 10 mg tablet  Commonly known as:  XARELTO  Take 1 tablet (10 mg total) by mouth daily with supper.     senna-docusate 8.6-50 mg tablet  Commonly known as:  PERICOLACE  Take 1 tablet by mouth 2 (two) times a day as needed for constipation.     triamcinolone 0.1 % cream  Commonly known as:  KENALOG            HISTORY OF PRESENT ILLNESS: Anna Neal is a 74 y.o. female being seen for dc in am. She was at [facility] 3/19 to 3/22 for Left TKA for osteoarthritis. Her knee was swollen and red upon admission and had xrays and US completed and sent to Ortho but no new orders and swelling has gone doen, no redness or warmth. We discussed dc ing her daily dressing to her knee as she will be dcing home and incision is d/i and well approximated. She can ambulate independently with walker. She is on Eliquis and will be sent home with oxyCodone from facility, further refills will be up to her PCP or ortho discursion.    SKILLED NURSING FACILITY COURSE:  During this TCU stay, patient completed all anticipated goals of therapy.      PHYSICAL EXAMINATION:    Vitals:    03/29/19 1348   BP: 145/87   Pulse: 81   Temp: 98.2  F (36.8  C)   Weight: 169 lb (76.7 kg)         GENERAL: Awake, Alert, oriented x3, not in any form of acute distress, answers questions appropriately, follows simple commands, conversant  HEENT: Head is normocephalic with normal hair distribution. No evidence of trauma. Ears: No acute purulent discharge. Eyes: Conjunctivae pink with no scleral jaundice. Nose: Normal mucosa and septum.  CHEST: No tenderness or deformity, no crepitus  LUNG: Clear to auscultation with good chest expansion. There are no crackles or wheezes, normal AP diameter.  BACK: No kyphosis of the thoracic spine. Symmetric, no curvature, ROM normal, no CVA tenderness, no spinal tenderness   CVS:  There is good S1  S2,  rhythm is regular.  ABDOMEN: Globular and soft, nontender to palpation, non distended, no masses, no organomegaly, good bowel sounds, no rebound or guarding, no peritoneal signs.   EXTREMITIES: Atraumatic. Full range of motion on both upper and lower extremities, there is no tenderness to palpation, no pedal edema, Left knee with faded bruising and swelling  SKIN: Warm and dry, no erythema noted, no rashes or lesions.L knee incision intact  NEUROLOGICAL: The patient is oriented to person, place and time. Strength and sensation are grossly intact. Face is symmetric.      LABS:  All labs reviewed in the nursing home record.        DISCHARGE PLAN:    Patient decline home care or is not home bound following discharge.  It is recommeneded for patient to continue PT/OT on an outpatient basis through  [facility]  Patient will follo up with PCP within 7- days after discharge for medication mangagment and appropriate lab studies.  Has a walker.      Electronically signed by:  Sabi Gates CNP  This progress note was completed using Dragon software and there may be grammatical errors.      For documentation purposes, chart review, medication management, and discharge coordination of care was greater than 35 minutes
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
## 2021-06-25 NOTE — ANESTHESIA PROCEDURE NOTES
Spinal Block    Start time: 3/19/2019 8:41 AM  End time: 3/19/2019 8:43 AM    Staffing:  Performing  Anesthesiologist: Billy Cat MD    Preanesthetic Checklist  Completed: patient identified, risks, benefits, and alternatives discussed, timeout performed, consent obtained, airway assessed, oxygen available, suction available, emergency drugs available and hand hygiene performed  Spinal Block  Patient position: sitting  Prep: ChloraPrep  Patient monitoring: heart rate, cardiac monitor, continuous pulse ox and blood pressure  Approach: midline  Location: L3-4  Injection technique: single-shot  Needle type: pencil-tip   Needle gauge: 24 G      Additional Notes:  Negative heme or paresthesia, clear CFS

## 2021-06-25 NOTE — ANESTHESIA POSTPROCEDURE EVALUATION
Patient: Anna Neal  LEFT TOTAL KNEE ARTHROPLASTY  Anesthesia type: spinal    Patient location: PACU  Last vitals:   Vitals:    03/19/19 1345   BP: (!) 182/98   Pulse: 81   Resp: 16   Temp: 36.6  C (97.9  F)   SpO2: 96%     Post vital signs: stable  Level of consciousness: awake and responds to simple questions  Post-anesthesia pain: pain controlled  Post-anesthesia nausea and vomiting: no  Pulmonary: unassisted, return to baseline, nasal cannula  Cardiovascular: stable and blood pressure at baseline  Hydration: adequate  Anesthetic events: no    QCDR Measures:  ASA# 11 - Randi-op Cardiac Arrest: ASA11B - Patient did NOT experience unanticipated cardiac arrest  ASA# 12 - Randi-op Mortality Rate: ASA12B - Patient did NOT die  ASA# 13 - PACU Re-Intubation Rate: NA - No ETT / LMA used for case  ASA# 10 - Composite Anes Safety: ASA10A - No serious adverse event    Additional Notes:

## 2021-06-25 NOTE — ANESTHESIA PREPROCEDURE EVALUATION
Anesthesia Evaluation      Patient summary reviewed   No history of anesthetic complications     Airway   Mallampati: II   Pulmonary - negative ROS and normal exam                          Cardiovascular - normal exam  (+) hypertension, ,      Neuro/Psych - negative ROS     Endo/Other - negative ROS      GI/Hepatic/Renal    (+) GERD well controlled,             Dental    (+) caps                       Anesthesia Plan  Planned anesthetic: spinal  Tibial and saphenous nerve blocks per surgeon request for post op analgesia  ASA 2     Anesthetic plan and risks discussed with: patient

## 2021-06-25 NOTE — ANESTHESIA CARE TRANSFER NOTE
Last vitals:   Vitals:    03/19/19 1000   BP: 103/56   Pulse: 80   Resp: 16   Temp: 37.1  C (98.8  F)   SpO2: 95%     Patient's level of consciousness is awake  Spontaneous respirations: yes  Maintains airway independently: yes  Dentition unchanged: yes  Oropharynx: oropharynx clear of all foreign objects    QCDR Measures:  ASA# 20 - Surgical Safety Checklist: WHO surgical safety checklist completed prior to induction    PQRS# 430 - Adult PONV Prevention: 4558F - Pt received => 2 anti-emetic agents (different classes) preop & intraop  ASA# 8 - Peds PONV Prevention: NA - Not pediatric patient, not GA or 2 or more risk factors NOT present  PQRS# 424 - Randi-op Temp Management: 4559F - At least one body temp DOCUMENTED => 35.5C or 95.9F within required timeframe  PQRS# 426 - PACU Transfer Protocol: - Transfer of care checklist used  ASA# 14 - Acute Post-op Pain: ASA14B - Patient did NOT experience pain >= 7 out of 10

## 2021-06-27 NOTE — PROGRESS NOTES
Progress Notes by Sabi Gates CNP at 3/29/2019 12:26 PM     Author: Sabi Gates CNP Service: -- Author Type: Nurse Practitioner    Filed: 3/29/2019  7:25 PM Encounter Date: 3/29/2019 Status: Signed    : Sabi Gates CNP (Nurse Practitioner)       [facility]      NAME:  Anna Neal             :  1945  MRN: [id number]  CODE STATUS:  FULL CODE    VISIT TYPE: DISCHARGE SUMMARY  FACILYTY: [facility] [[id number]]                    PRIMARY CARE PROVIDER: Fady Christy MD    DISCHARGE DIAGNOSIS:      1. Hx of total knee replacement, left    2. Primary osteoarthritis of left knee         DISCHARGE MEDICATIONS:         Medication List           Accurate as of 3/29/19  1:49 PM. If you have any questions, ask your nurse or doctor.               CONTINUE taking these medications    acetaminophen 500 MG tablet  Commonly known as:  TYLENOL  Take 2 tablets (1,000 mg total) by mouth 3 (three) times a day.     albuterol 90 mcg/actuation inhaler  Commonly known as:  PROAIR HFA;PROVENTIL HFA;VENTOLIN HFA     ascorbic acid (vitamin C) 500 MG tablet  Commonly known as:  VITAMIN C     cholecalciferol (vitamin D3) 1,000 unit tablet     EPINEPHrine 1 mg/mL (1 mL) injection  Commonly known as:  ADRENALIN     estradiol 0.01 % (0.1 mg/gram) vaginal cream  Commonly known as:  ESTRACE     famotidine 20 MG tablet  Commonly known as:  PEPCID     fexofenadine 180 MG tablet  Commonly known as:  ALLEGRA     HAIR-SKIN-NAILS (PABA) ORAL     lactase 3,000 unit tablet  Commonly known as:  LACTAID     MOVE FREE ULTRA (BORON) ORAL     multivitamin with minerals 9 mg iron-400 mcg Tab tablet  Commonly known as:  THERA-M     nicotine 7 mg/24 hr  Commonly known as:  NICODERM CQ     omalizumab 150 mg injection  Commonly known as:  XOLAIR     oxyCODONE 5 MG immediate release tablet  Commonly known as:  ROXICODONE  Take 1-2 tablets (5-10 mg total) by mouth every 4 (four) hours as  needed. Take 1 tab for pain 1-6/10, take 2 tabs for pain 7-10/10.     polyethylene glycol 17 gram packet  Commonly known as:  MIRALAX     rivaroxaban 10 mg tablet  Commonly known as:  XARELTO  Take 1 tablet (10 mg total) by mouth daily with supper.     senna-docusate 8.6-50 mg tablet  Commonly known as:  PERICOLACE  Take 1 tablet by mouth 2 (two) times a day as needed for constipation.     triamcinolone 0.1 % cream  Commonly known as:  KENALOG            HISTORY OF PRESENT ILLNESS: Anna Neal is a 74 y.o. female being seen for dc in am. She was at [facility] 3/19 to 3/22 for Left TKA for osteoarthritis. Her knee was swollen and red upon admission and had xrays and US completed and sent to Ortho but no new orders and swelling has gone doen, no redness or warmth. We discussed dc ing her daily dressing to her knee as she will be dcing home and incision is d/i and well approximated. She can ambulate independently with walker. She is on Eliquis and will be sent home with oxyCodone from facility, further refills will be up to her PCP or ortho discursion.    SKILLED NURSING FACILITY COURSE:  During this TCU stay, patient completed all anticipated goals of therapy.      PHYSICAL EXAMINATION:    Vitals:    03/29/19 1348   BP: 145/87   Pulse: 81   Temp: 98.2  F (36.8  C)   Weight: 169 lb (76.7 kg)         GENERAL: Awake, Alert, oriented x3, not in any form of acute distress, answers questions appropriately, follows simple commands, conversant  HEENT: Head is normocephalic with normal hair distribution. No evidence of trauma. Ears: No acute purulent discharge. Eyes: Conjunctivae pink with no scleral jaundice. Nose: Normal mucosa and septum.  CHEST: No tenderness or deformity, no crepitus  LUNG: Clear to auscultation with good chest expansion. There are no crackles or wheezes, normal AP diameter.  BACK: No kyphosis of the thoracic spine. Symmetric, no curvature, ROM normal, no CVA tenderness, no spinal tenderness   CVS:  There is good S1  S2,  rhythm is regular.  ABDOMEN: Globular and soft, nontender to palpation, non distended, no masses, no organomegaly, good bowel sounds, no rebound or guarding, no peritoneal signs.   EXTREMITIES: Atraumatic. Full range of motion on both upper and lower extremities, there is no tenderness to palpation, no pedal edema, Left knee with faded bruising and swelling  SKIN: Warm and dry, no erythema noted, no rashes or lesions.L knee incision intact  NEUROLOGICAL: The patient is oriented to person, place and time. Strength and sensation are grossly intact. Face is symmetric.      LABS:  All labs reviewed in the nursing home record.        DISCHARGE PLAN:    Patient decline home care or is not home bound following discharge.  It is recommeneded for patient to continue PT/OT on an outpatient basis through  [facility]  Patient will follo up with PCP within 7- days after discharge for medication mangagment and appropriate lab studies.  Has a walker.      Electronically signed by:  Sabi Gates CNP  This progress note was completed using Dragon software and there may be grammatical errors.      For documentation purposes, chart review, medication management, and discharge coordination of care was greater than 35 minutes

## 2021-09-10 ENCOUNTER — OFFICE VISIT (OUTPATIENT)
Dept: FAMILY MEDICINE | Facility: OTHER | Age: 76
End: 2021-09-10
Attending: NURSE PRACTITIONER
Payer: MEDICARE

## 2021-09-10 VITALS
DIASTOLIC BLOOD PRESSURE: 70 MMHG | BODY MASS INDEX: 28.89 KG/M2 | SYSTOLIC BLOOD PRESSURE: 124 MMHG | WEIGHT: 168.3 LBS | TEMPERATURE: 96.9 F | RESPIRATION RATE: 20 BRPM | HEART RATE: 73 BPM | OXYGEN SATURATION: 97 %

## 2021-09-10 DIAGNOSIS — R39.89 SUSPECTED UTI: Primary | ICD-10-CM

## 2021-09-10 DIAGNOSIS — R39.9 UTI SYMPTOMS: ICD-10-CM

## 2021-09-10 LAB
ALBUMIN UR-MCNC: NEGATIVE MG/DL
APPEARANCE UR: CLEAR
BILIRUB UR QL STRIP: NEGATIVE
COLOR UR AUTO: NORMAL
GLUCOSE UR STRIP-MCNC: NEGATIVE MG/DL
HGB UR QL STRIP: NEGATIVE
KETONES UR STRIP-MCNC: NEGATIVE MG/DL
LEUKOCYTE ESTERASE UR QL STRIP: NEGATIVE
NITRATE UR QL: NEGATIVE
PH UR STRIP: 7 [PH] (ref 5–9)
SP GR UR STRIP: 1.01 (ref 1–1.03)
UROBILINOGEN UR STRIP-MCNC: NORMAL MG/DL

## 2021-09-10 PROCEDURE — G0463 HOSPITAL OUTPT CLINIC VISIT: HCPCS

## 2021-09-10 PROCEDURE — 87086 URINE CULTURE/COLONY COUNT: CPT | Mod: ZL | Performed by: NURSE PRACTITIONER

## 2021-09-10 PROCEDURE — 81003 URINALYSIS AUTO W/O SCOPE: CPT | Mod: ZL | Performed by: NURSE PRACTITIONER

## 2021-09-10 PROCEDURE — 99214 OFFICE O/P EST MOD 30 MIN: CPT | Performed by: NURSE PRACTITIONER

## 2021-09-10 RX ORDER — CARBIDOPA AND LEVODOPA 50; 200 MG/1; MG/1
1 TABLET, EXTENDED RELEASE ORAL SEE ADMIN INSTRUCTIONS
COMMUNITY
Start: 2019-10-28

## 2021-09-10 RX ORDER — ESTRADIOL 0.1 MG/G
CREAM VAGINAL
COMMUNITY
Start: 2019-11-27

## 2021-09-10 RX ORDER — CIPROFLOXACIN 250 MG/1
250 TABLET, FILM COATED ORAL 2 TIMES DAILY
Qty: 10 TABLET | Refills: 0 | Status: SHIPPED | OUTPATIENT
Start: 2021-09-10 | End: 2021-09-15

## 2021-09-10 RX ORDER — FEXOFENADINE HCL 180 MG/1
180 TABLET ORAL
COMMUNITY

## 2021-09-10 RX ORDER — METOPROLOL SUCCINATE 25 MG/1
0.5 TABLET, EXTENDED RELEASE ORAL DAILY
COMMUNITY
Start: 2021-05-01

## 2021-09-10 RX ORDER — ROPINIROLE 1 MG/1
1 TABLET, FILM COATED ORAL AT BEDTIME
COMMUNITY
Start: 2021-09-06

## 2021-09-10 RX ORDER — FAMOTIDINE 20 MG/1
40 TABLET, FILM COATED ORAL
COMMUNITY

## 2021-09-10 ASSESSMENT — PAIN SCALES - GENERAL: PAINLEVEL: EXTREME PAIN (9)

## 2021-09-10 NOTE — NURSING NOTE
"Chief Complaint   Patient presents with     Urinary Problem     Patient is here for urinary urgency, frequency and pain urinating that started about 4 days ago.     Initial /70   Pulse 73   Temp 96.9  F (36.1  C) (Tympanic)   Resp 20   Wt 76.3 kg (168 lb 4.8 oz)   SpO2 97%   BMI 28.89 kg/m   Estimated body mass index is 28.89 kg/m  as calculated from the following:    Height as of 6/22/19: 1.626 m (5' 4\").    Weight as of this encounter: 76.3 kg (168 lb 4.8 oz).  Medication Reconciliation: complete    Jazmine Weiss LPN  "

## 2021-09-10 NOTE — PROGRESS NOTES
HPI:    Anna Neal is a 76 year old female  who presents to [facility] today for UTI.    Symptoms for the past 4 days including dysuria, frequency, urgency, decreased output due to pain.  No noted hematuria or urine color change.  No noted urine odor.  No fevers or chills.  Appetite at baseline.  No nausea or vomiting.  Suprapubic pressure.  Lots of increased gut activity but no cramping or pain.  No change in bowel patterns, no constipation or diarrhea.  Chronic back pain, no change.  No vaginal itching, discharge or itching.    States she only gets an UTI once a year when she travels/vacations in [place] for 2 weeks every year at a resort, she attributes to well water due to minerals in the water.  She has used Cipro in the past with good relief and no side effects.      Past Medical History:   Diagnosis Date     Arthritis      Cancer (H)     basil and squamous skin cancer.  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      Lymphocytic colitis      Restless leg syndrome      Past Surgical History:   Procedure Laterality Date     APPENDECTOMY       APPENDECTOMY       BREAST SURGERY      tumor right breast removed     C TOTAL KNEE ARTHROPLASTY Left 3/19/2019    Procedure: LEFT TOTAL KNEE ARTHROPLASTY;  Surgeon: Isra Dove MD;  Location: [facility];  Service: Orthopedics     DILATION AND CURETTAGE, DIAGNOSTIC / THERAPEUTIC       GYN SURGERY      hysterectomy      KNEE SURGERY       ORTHOPEDIC SURGERY      arthroscopy bilateral knees     SEPTOPLASTY       SOFT TISSUE SURGERY Left     left arm sarcoma removed     Social History     Tobacco Use     Smoking status: Former Smoker     Packs/day: 0.50     Types: Cigars     Smokeless tobacco: Never Used   Substance Use Topics     Alcohol use: No     Current Outpatient Medications   Medication Sig Dispense Refill     carbidopa-levodopa (SINEMET CR)  MG CR tablet Take 1 tablet by mouth See Admin Instructions       cholecalciferol 50 MCG (2000 UT) tablet Take 2,000  Units by mouth 2 times daily       estradiol (ESTRACE) 0.1 MG/GM vaginal cream INSERT 2 GRAMS INTO THE VAGINA ONCE OR TWICE WEEKLY       famotidine (PEPCID) 20 MG tablet Take 40 mg by mouth       fexofenadine (ALLEGRA) 180 MG tablet Take 180 mg by mouth       metoprolol succinate ER (TOPROL-XL) 25 MG 24 hr tablet Take 0.5 tablets by mouth daily       rOPINIRole (REQUIP) 1 MG tablet Take 1 tablet by mouth At Bedtime       Allergies   Allergen Reactions     Aspirin Hives     Bee Venom Hives and Itching     Mesalamine Rash     Nsaids Anaphylaxis, Hives and Swelling     Nuts Shortness Of Breath, Swelling and Anaphylaxis     Tree nuts      Sulfamethoxazole-Trimethoprim Hives     Atenolol Other (See Comments)     Fatigue;  12-10;     Cetirizine Other (See Comments)       Other reaction(s): Insomnia         Erythromycin Nausea and GI Disturbance     GI Sx  Other reaction(s): Gastrointestinal, GI intolerance           Gabapentin        Other reaction(s): Mental Status Change  Panic attack         Mometasone Other (See Comments) and Unknown         epistaxis       Nortriptyline Unknown       Other reaction(s): Sedation         Paroxetine Other (See Comments) and Fatigue     tired       Penicillins Swelling and Unknown     unknown  Local swelling with injection at age 16       Pramipexole        Other reaction(s): Mental Status Change, Taste, Changes  Wired, over-alert, was in turbo mode, walking around, could not sit still.            Trazodone Other (See Comments)     VERY TIRED  RLS;  JUMPED OUT OF MY SKIN;         Estradiol Itching and Rash     Latex Rash     unknown           Past medical history, past surgical history, current medications and allergies reviewed and accurate to the best of my knowledge.        ROS:  Refer to HPI    /70   Pulse 73   Temp 96.9  F (36.1  C) (Tympanic)   Resp 20   Wt 76.3 kg (168 lb 4.8 oz)   SpO2 97%   BMI 28.89 kg/m      EXAM:  General Appearance: Well appearing elderly female,  appropriate appearance for age. No acute distress  Respiratory: normal chest wall and respirations.  Normal effort.  Clear to auscultation bilaterally, no wheezing, crackles or rhonchi.  No increased work of breathing.  No cough appreciated.  Cardiac: RRR with no murmurs  Abdomen: soft, mild generalized tenderness, no rigidity, no rebound tenderness or guarding, normal bowel sounds present  :  No suprapubic tenderness to palpation.  No CVA tenderness to palpation.    Musculoskeletal:  Equal movement of bilateral upper extremities.  Equal movement of bilateral lower extremities.  Normal gait.    Psychological: normal affect, alert, oriented, and pleasant.       Labs:  Results for orders placed or performed in visit on 09/10/21   UA reflex to Microscopic and Culture     Status: Normal    Specimen: Urine, Midstream   Result Value Ref Range    Color Urine Light Yellow Colorless, Straw, Light Yellow, Yellow    Appearance Urine Clear Clear    Glucose Urine Negative Negative mg/dL    Bilirubin Urine Negative Negative    Ketones Urine Negative Negative mg/dL    Specific Gravity Urine 1.009 1.000 - 1.030    Blood Urine Negative Negative    pH Urine 7.0 5.0 - 9.0    Protein Albumin Urine Negative Negative mg/dL    Urobilinogen Urine Normal Normal, 2.0 mg/dL    Nitrite Urine Negative Negative    Leukocyte Esterase Urine Negative Negative    Narrative    Microscopic not indicated           ASSESSMENT/PLAN:    I have reviewed the nursing notes.  I have reviewed the findings, diagnosis, plan and need for follow up with the patient.    1. UTI symptoms    - UA reflex to Microscopic and Culture  - Urine Culture Aerobic Bacterial    2. Suspected UTI    Due to severity of patient symptoms and hx of UTIs patient will be started on antibiotics while awaiting urine culture results.    - ciprofloxacin (CIPRO) 250 MG tablet; Take 1 tablet (250 mg) by mouth 2 times daily for 5 days  Dispense: 10 tablet; Refill: 0    Urinalysis - normal      Urine culture pending  Will call if culture warrants change of abx.     May use Pyridium OTC PRN.     Encouraged fluids and frequent bladder emptying.    Discussed warning signs/symptoms indicative of need to f/u  Follow up if symptoms persist or worsen or concerns      I explained my diagnostic considerations and recommendations to the patient, who voiced understanding and agreement with the treatment plan. All questions were answered. We discussed potential side effects of any prescribed or recommended therapies, as well as expectations for response to treatments.

## 2021-09-12 ENCOUNTER — HEALTH MAINTENANCE LETTER (OUTPATIENT)
Age: 76
End: 2021-09-12

## 2021-09-12 LAB — BACTERIA UR CULT: ABNORMAL

## 2022-01-31 ENCOUNTER — HOSPITAL ENCOUNTER (EMERGENCY)
Facility: CLINIC | Age: 77
Discharge: HOME | End: 2022-01-31
Attending: EMERGENCY MEDICINE | Admitting: EMERGENCY MEDICINE
Payer: MEDICARE

## 2022-01-31 ENCOUNTER — APPOINTMENT (OUTPATIENT)
Dept: CT IMAGING | Facility: CLINIC | Age: 77
End: 2022-01-31
Attending: EMERGENCY MEDICINE
Payer: MEDICARE

## 2022-01-31 VITALS
BODY MASS INDEX: 28.84 KG/M2 | WEIGHT: 168 LBS | TEMPERATURE: 97.7 F | OXYGEN SATURATION: 96 % | RESPIRATION RATE: 19 BRPM | DIASTOLIC BLOOD PRESSURE: 86 MMHG | SYSTOLIC BLOOD PRESSURE: 170 MMHG | HEART RATE: 78 BPM

## 2022-01-31 DIAGNOSIS — U07.1 INFECTION DUE TO 2019 NOVEL CORONAVIRUS: ICD-10-CM

## 2022-01-31 DIAGNOSIS — R51.9 ACUTE NONINTRACTABLE HEADACHE, UNSPECIFIED HEADACHE TYPE: ICD-10-CM

## 2022-01-31 LAB
ANION GAP SERPL CALCULATED.3IONS-SCNC: 11 MMOL/L (ref 5–18)
BASOPHILS # BLD AUTO: 0 10E3/UL (ref 0–0.2)
BASOPHILS NFR BLD AUTO: 0 %
BUN SERPL-MCNC: 16 MG/DL (ref 8–28)
CALCIUM SERPL-MCNC: 10 MG/DL (ref 8.5–10.5)
CHLORIDE BLD-SCNC: 106 MMOL/L (ref 98–107)
CO2 SERPL-SCNC: 25 MMOL/L (ref 22–31)
CREAT SERPL-MCNC: 0.77 MG/DL (ref 0.6–1.1)
EOSINOPHIL # BLD AUTO: 0.2 10E3/UL (ref 0–0.7)
EOSINOPHIL NFR BLD AUTO: 3 %
ERYTHROCYTE [DISTWIDTH] IN BLOOD BY AUTOMATED COUNT: 12.7 % (ref 10–15)
FLUAV RNA SPEC QL NAA+PROBE: NEGATIVE
FLUBV RNA RESP QL NAA+PROBE: NEGATIVE
GFR SERPL CREATININE-BSD FRML MDRD: 79 ML/MIN/1.73M2
GLUCOSE BLD-MCNC: 109 MG/DL (ref 70–125)
GLUCOSE BLDC GLUCOMTR-MCNC: 82 MG/DL (ref 70–99)
HCT VFR BLD AUTO: 51.8 % (ref 35–47)
HGB BLD-MCNC: 15.5 G/DL (ref 11.7–15.7)
IMM GRANULOCYTES # BLD: 0 10E3/UL
IMM GRANULOCYTES NFR BLD: 1 %
LYMPHOCYTES # BLD AUTO: 1 10E3/UL (ref 0.8–5.3)
LYMPHOCYTES NFR BLD AUTO: 15 %
MCH RBC QN AUTO: 30 PG (ref 26.5–33)
MCHC RBC AUTO-ENTMCNC: 29.9 G/DL (ref 31.5–36.5)
MCV RBC AUTO: 100 FL (ref 78–100)
MONOCYTES # BLD AUTO: 0.4 10E3/UL (ref 0–1.3)
MONOCYTES NFR BLD AUTO: 6 %
NEUTROPHILS # BLD AUTO: 5.2 10E3/UL (ref 1.6–8.3)
NEUTROPHILS NFR BLD AUTO: 75 %
NRBC # BLD AUTO: 0 10E3/UL
NRBC BLD AUTO-RTO: 0 /100
PLATELET # BLD AUTO: 224 10E3/UL (ref 150–450)
POTASSIUM BLD-SCNC: 4.1 MMOL/L (ref 3.5–5)
RBC # BLD AUTO: 5.17 10E6/UL (ref 3.8–5.2)
SARS-COV-2 RNA RESP QL NAA+PROBE: POSITIVE
SODIUM SERPL-SCNC: 142 MMOL/L (ref 136–145)
WBC # BLD AUTO: 6.8 10E3/UL (ref 4–11)

## 2022-01-31 PROCEDURE — 70498 CT ANGIOGRAPHY NECK: CPT

## 2022-01-31 PROCEDURE — 96374 THER/PROPH/DIAG INJ IV PUSH: CPT | Mod: 59

## 2022-01-31 PROCEDURE — C9803 HOPD COVID-19 SPEC COLLECT: HCPCS

## 2022-01-31 PROCEDURE — 96375 TX/PRO/DX INJ NEW DRUG ADDON: CPT

## 2022-01-31 PROCEDURE — 99285 EMERGENCY DEPT VISIT HI MDM: CPT | Mod: 25

## 2022-01-31 PROCEDURE — 250N000011 HC RX IP 250 OP 636: Performed by: EMERGENCY MEDICINE

## 2022-01-31 PROCEDURE — 250N000013 HC RX MED GY IP 250 OP 250 PS 637: Performed by: EMERGENCY MEDICINE

## 2022-01-31 PROCEDURE — 96361 HYDRATE IV INFUSION ADD-ON: CPT

## 2022-01-31 PROCEDURE — 96376 TX/PRO/DX INJ SAME DRUG ADON: CPT

## 2022-01-31 PROCEDURE — 258N000003 HC RX IP 258 OP 636: Performed by: EMERGENCY MEDICINE

## 2022-01-31 PROCEDURE — 70496 CT ANGIOGRAPHY HEAD: CPT

## 2022-01-31 PROCEDURE — 85025 COMPLETE CBC W/AUTO DIFF WBC: CPT | Performed by: EMERGENCY MEDICINE

## 2022-01-31 PROCEDURE — 36415 COLL VENOUS BLD VENIPUNCTURE: CPT | Performed by: EMERGENCY MEDICINE

## 2022-01-31 PROCEDURE — 87636 SARSCOV2 & INF A&B AMP PRB: CPT | Performed by: EMERGENCY MEDICINE

## 2022-01-31 PROCEDURE — 82310 ASSAY OF CALCIUM: CPT | Performed by: EMERGENCY MEDICINE

## 2022-01-31 RX ORDER — DIPHENHYDRAMINE HYDROCHLORIDE 50 MG/ML
25 INJECTION INTRAMUSCULAR; INTRAVENOUS ONCE
Status: COMPLETED | OUTPATIENT
Start: 2022-01-31 | End: 2022-01-31

## 2022-01-31 RX ORDER — IOPAMIDOL 755 MG/ML
75 INJECTION, SOLUTION INTRAVASCULAR ONCE
Status: COMPLETED | OUTPATIENT
Start: 2022-01-31 | End: 2022-01-31

## 2022-01-31 RX ORDER — ACETAMINOPHEN 325 MG/1
650 TABLET ORAL ONCE
Status: COMPLETED | OUTPATIENT
Start: 2022-01-31 | End: 2022-01-31

## 2022-01-31 RX ORDER — LORAZEPAM 2 MG/ML
1 INJECTION INTRAMUSCULAR ONCE
Status: COMPLETED | OUTPATIENT
Start: 2022-01-31 | End: 2022-01-31

## 2022-01-31 RX ADMIN — ACETAMINOPHEN 650 MG: 325 TABLET ORAL at 16:24

## 2022-01-31 RX ADMIN — IOPAMIDOL 75 ML: 755 INJECTION, SOLUTION INTRAVENOUS at 17:32

## 2022-01-31 RX ADMIN — SODIUM CHLORIDE 1000 ML: 9 INJECTION, SOLUTION INTRAVENOUS at 16:23

## 2022-01-31 RX ADMIN — DIPHENHYDRAMINE HYDROCHLORIDE 25 MG: 50 INJECTION INTRAMUSCULAR; INTRAVENOUS at 16:25

## 2022-01-31 RX ADMIN — PROCHLORPERAZINE EDISYLATE 5 MG: 5 INJECTION INTRAMUSCULAR; INTRAVENOUS at 16:26

## 2022-01-31 RX ADMIN — DIPHENHYDRAMINE HYDROCHLORIDE 25 MG: 50 INJECTION INTRAMUSCULAR; INTRAVENOUS at 16:57

## 2022-01-31 RX ADMIN — LORAZEPAM 1 MG: 2 INJECTION INTRAMUSCULAR; INTRAVENOUS at 16:57

## 2022-01-31 NOTE — ED TRIAGE NOTES
Patient is here with a headache, and neck pain that started yesterday with no injury. She is nauseated and had one emesis.

## 2022-01-31 NOTE — ED NOTES
Dr. Phan notified that patient is covid positive. Shortly after compazine was administered, pt stated her legs started to jerk and she felt as if her medication for her neurologic condition were not working. Notified Dr. Phan, one time dose of benadryl and ativan given via IV.

## 2022-01-31 NOTE — ED PROVIDER NOTES
EMERGENCY DEPARTMENT ENCOUNTER      NAME: Anna Neal  AGE: 76 year old female  YOB: 1945  MRN: [id number]  EVALUATION DATE & TIME: 2022  3:41 PM    PCP: No Ref-Primary, Physician    ED PROVIDER: Jose Eduardo Phan M.D.      Chief Complaint   Patient presents with     Headache     Neck Pain       FINAL IMPRESSION:  1. Infection due to 2019 novel coronavirus    2. Acute nonintractable headache, unspecified headache type        ED COURSE & MEDICAL DECISION MAKIN year old female presents to the Emergency Department for evaluation of headache.  She is vitally stable when she arrives to the emergency department.  Neurological exam is unremarkable.  She does have a rather infectious constellation of symptoms with headache, cough, nausea and vomiting.  Her Covid test here is positive.  Given her abrupt onset of severe headache, did obtain CT and CT angiography which was  negative.  She does not have any hypoxia or severe respiratory concerns at this time.  Her headache was treated with Compazine and Benadryl.  She did develop some restlessness after Compazine and was given additional Ativan or Benadryl.  She also received some IV fluids.  Basic lab evaluations here are stable and reassuring.  She was updated about her diagnosis, headache was significantly improved, she continued to have some restlessness but this was overall improved as well with medications.  We discussed supportive cares at home.  Patient will continue to stay strictly isolated.  She was comfortable with the plan to discharge home and can follow-up as needed with her clinic.    3:54 PM I met with the patient, obtained an initial history, performed an examination and discussed the plan. PPE worn throughout all interactions with the patient, including gloves, surgical mask, N95 mask, safety glasses, and surgical cap.   6:05 PM We discussed the plan for discharge and the patient is agreeable. Reviewed supportive cares,  "symptomatic treatment, outpatient follow up, and reasons to return to the Emergency Department. Patient to be discharged by ED RN.     At the conclusion of the encounter I discussed the results of all of the tests and the disposition. The questions were answered. The patient or family acknowledged understanding and was agreeable with the care plan.       MEDICATIONS GIVEN IN THE EMERGENCY:  Medications   prochlorperazine (COMPAZINE) injection 5 mg (5 mg Intravenous Given 1/31/22 1626)   diphenhydrAMINE (BENADRYL) injection 25 mg (25 mg Intravenous Given 1/31/22 1625)   0.9% sodium chloride BOLUS (0 mLs Intravenous Stopped 1/31/22 1758)   acetaminophen (TYLENOL) tablet 650 mg (650 mg Oral Given 1/31/22 1624)   diphenhydrAMINE (BENADRYL) injection 25 mg (25 mg Intravenous Given 1/31/22 1657)   LORazepam (ATIVAN) injection 1 mg (1 mg Intravenous Given 1/31/22 1657)   iopamidol (ISOVUE-370) solution 75 mL (75 mLs Intravenous Given 1/31/22 1732)       NEW PRESCRIPTIONS STARTED AT TODAY'S ER VISIT  New Prescriptions    No medications on file          =================================================================    HPI    Patient information was obtained from: Patient     Use of : N/A       Anna Neal is a 76 year old female with a pertinent history of arthritis, RLS, GERD, DJD, HTN, HLD, and DMT2, who presents to this ED via private car for evaluation of headache and neck pain.     The patient reports that she has felt \"sick\" since last night. She endorses a severe headache that is localized to the back of her head and her neck, as well as nausea and a \"phlemy cough\". She is otherwise in her usual state of health and denies any other concerns at this time.       REVIEW OF SYSTEMS   All systems reviewed and negative except as noted in HPI.    PAST MEDICAL HISTORY:  Past Medical History:   Diagnosis Date     Arthritis      Cancer (H)     basil and squamous skin cancer.  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      " Lymphocytic colitis      Restless leg syndrome        PAST SURGICAL HISTORY:  Past Surgical History:   Procedure Laterality Date     APPENDECTOMY       APPENDECTOMY       BREAST SURGERY      tumor right breast removed     DILATION AND CURETTAGE, DIAGNOSTIC / THERAPEUTIC       GYN SURGERY      hysterectomy      KNEE SURGERY       ORTHOPEDIC SURGERY      arthroscopy bilateral knees     SEPTOPLASTY       SOFT TISSUE SURGERY Left     left arm sarcoma removed     ZZC TOTAL KNEE ARTHROPLASTY Left 3/19/2019    Procedure: LEFT TOTAL KNEE ARTHROPLASTY;  Surgeon: Isra Dove MD;  Location: [facility];  Service: Orthopedics           CURRENT MEDICATIONS:    No current facility-administered medications for this encounter.     Current Outpatient Medications   Medication     carbidopa-levodopa (SINEMET CR)  MG CR tablet     cholecalciferol 50 MCG (2000 UT) tablet     estradiol (ESTRACE) 0.1 MG/GM vaginal cream     famotidine (PEPCID) 20 MG tablet     fexofenadine (ALLEGRA) 180 MG tablet     metoprolol succinate ER (TOPROL-XL) 25 MG 24 hr tablet     rOPINIRole (REQUIP) 1 MG tablet         ALLERGIES:  Allergies   Allergen Reactions     Aspirin Hives     Bee Venom Hives and Itching     Mesalamine Rash     Nsaids Anaphylaxis, Hives and Swelling     Nuts Shortness Of Breath, Swelling and Anaphylaxis     Tree nuts      Sulfamethoxazole-Trimethoprim Hives     Atenolol Other (See Comments)     Fatigue;  12-10;     Cetirizine Other (See Comments)       Other reaction(s): Insomnia         Compazine [Prochlorperazine] Other (See Comments)     Received on 1/31/2022. Pt reports sudden onset knee jerking, concerned that this exacerbated her neurological condition with her legs.     Erythromycin Nausea and GI Disturbance     GI Sx  Other reaction(s): Gastrointestinal, GI intolerance           Gabapentin        Other reaction(s): Mental Status Change  Panic attack         Mometasone Other (See Comments) and Unknown          epistaxis       Nortriptyline Unknown       Other reaction(s): Sedation         Paroxetine Other (See Comments) and Fatigue     tired       Penicillins Swelling and Unknown     unknown  Local swelling with injection at age 16       Pramipexole        Other reaction(s): Mental Status Change, Taste, Changes  Wired, over-alert, was in turbo mode, walking around, could not sit still.            Trazodone Other (See Comments)     VERY TIRED  RLS;  JUMPED OUT OF MY SKIN;         Estradiol Itching and Rash     Latex Rash     unknown         FAMILY HISTORY:  History reviewed. No pertinent family history.    SOCIAL HISTORY:   Social History     Socioeconomic History     Marital status:      Spouse name: Not on file     Number of children: Not on file     Years of education: Not on file     Highest education level: Not on file   Occupational History     Not on file   Tobacco Use     Smoking status: Former Smoker     Packs/day: 0.50     Types: Cigars     Smokeless tobacco: Never Used   Vaping Use     Vaping Use: Never used   Substance and Sexual Activity     Alcohol use: No     Drug use: No     Sexual activity: Not on file   Other Topics Concern     Parent/sibling w/ CABG, MI or angioplasty before 65F 55M? Not Asked   Social History Narrative     Not on file     Social Determinants of Health     Financial Resource Strain: Not on file   Food Insecurity: Not on file   Transportation Needs: Not on file   Physical Activity: Not on file   Stress: Not on file   Social Connections: Not on file   Intimate Partner Violence: Not on file   Housing Stability: Not on file       VITALS:  BP (!) 170/86 (BP Location: Right arm, Patient Position: Semi-Bear's)   Pulse 78   Temp (!) 96.5  F (35.8  C) (Temporal)   Resp 19   Wt 76.2 kg (168 lb)   SpO2 96%   BMI 28.84 kg/m      PHYSICAL EXAM    Constitutional: Well developed, Well nourished, NAD.  HENT: Normocephalic, Atraumatic. Neck Supple.  Eyes: EOMI, Conjunctiva  normal.  Respiratory: Breathing comfortably on room air. Speaks full sentences easily. Lungs clear to ascultation.  Cardiovascular: Normal heart rate, Regular rhythm. No peripheral edema.  Abdomen: Soft, nontender  Musculoskeletal: Good range of motion in all major joints. No major deformities noted.  Integument: Warm, Dry.  Neurologic: Alert & awake, Normal motor function, Normal sensory function, No focal deficits noted.   Psychiatric: Cooperative. Affect appropriate.     LAB:  All pertinent labs reviewed and interpreted.  Labs Ordered and Resulted from Time of ED Arrival to Time of ED Departure   CBC WITH PLATELETS AND DIFFERENTIAL - Abnormal       Result Value    WBC Count 6.8      RBC Count 5.17      Hemoglobin 15.5      Hematocrit 51.8 (*)           MCH 30.0      MCHC 29.9 (*)     RDW 12.7      Platelet Count 224      % Neutrophils 75      % Lymphocytes 15      % Monocytes 6      % Eosinophils 3      % Basophils 0      % Immature Granulocytes 1      NRBCs per 100 WBC 0      Absolute Neutrophils 5.2      Absolute Lymphocytes 1.0      Absolute Monocytes 0.4      Absolute Eosinophils 0.2      Absolute Basophils 0.0      Absolute Immature Granulocytes 0.0      Absolute NRBCs 0.0     INFLUENZA A/B & SARS-COV2 PCR MULTIPLEX - Abnormal    Influenza A PCR Negative      Influenza B PCR Negative      SARS CoV2 PCR Positive (*)    BASIC METABOLIC PANEL - Normal    Sodium 142      Potassium 4.1      Chloride 106      Carbon Dioxide (CO2) 25      Anion Gap 11      Urea Nitrogen 16      Creatinine 0.77      Calcium 10.0      Glucose 109      GFR Estimate 79     GLUCOSE BY METER - Normal    GLUCOSE BY METER POCT 82         RADIOLOGY:  Reviewed all pertinent imaging. Please see official radiology report.  CTA Head Neck with Contrast   Final Result   IMPRESSION:    HEAD CT:   1.  No acute intracranial process.      HEAD CTA:    1.  Normal CTA [place].      NECK CTA:   1.  Normal neck CTA.          [facility]  Tavo, am serving as a scribe to document services personally performed by Dr. Jose Eduardo Phan, based on my observation and the provider's statements to me. I, Jose Eduardo Phan MD attest that Manuel Vo is acting in a scribe capacity, has observed my performance of the services and has documented them in accordance with my direction.    Jose Eduardo Phan M.D.  Emergency Medicine  [facility] EMERGENCY ROOM  [address] [facility] [address]  [phone]  Dept: [phone]     Jose Eduardo Phan MD  01/31/22 1260

## 2022-02-01 NOTE — ED NOTES
Patient discharged home with AVS. IVs removed prior to discharge. Daughter notified of discharge instructions and care while in ER. All questions answered. Daughter sent friend to ER to pick patient up. JAY 1945.

## 2022-02-01 NOTE — DISCHARGE INSTRUCTIONS
You were seen today in the emergency department at [facility] for a headache.  Your evaluation revealed that you have COVID-19 illness.  Other labs and CT scan of your head were negative for any acute intracranial process to account for your symptoms.  We do think that your constellation of symptoms is consistent with this illness.  Reassuringly your vital signs and oxygen levels are stable and appropriate.  We would like you to continue taking Tylenol 650 mg every 6 hours, continue to drink plenty of liquids at least 2 to 3 L of water or Gatorade daily to keep yourself well-hydrated.  You can take Benadryl at nighttime to help with headache and sleep.  Please stay strictly isolated at home for at least the next 5 days and while still having any potentially contagious upper respiratory symptoms.  Please contact your clinic to notify them about this ED visit and try to follow-up with them for any lingering symptoms within 1 week.

## 2022-09-28 ENCOUNTER — HOSPITAL ENCOUNTER (EMERGENCY)
Facility: HOSPITAL | Age: 77
Discharge: HOME | End: 2022-09-28
Attending: EMERGENCY MEDICINE | Admitting: EMERGENCY MEDICINE
Payer: MEDICARE

## 2022-09-28 VITALS
HEIGHT: 64 IN | OXYGEN SATURATION: 96 % | DIASTOLIC BLOOD PRESSURE: 71 MMHG | BODY MASS INDEX: 28.17 KG/M2 | WEIGHT: 165 LBS | SYSTOLIC BLOOD PRESSURE: 127 MMHG | RESPIRATION RATE: 16 BRPM | TEMPERATURE: 98.4 F | HEART RATE: 90 BPM

## 2022-09-28 DIAGNOSIS — R04.0 EPISTAXIS: ICD-10-CM

## 2022-09-28 PROCEDURE — 99284 EMERGENCY DEPT VISIT MOD MDM: CPT | Mod: 25

## 2022-09-28 PROCEDURE — 250N000013 HC RX MED GY IP 250 OP 250 PS 637: Performed by: EMERGENCY MEDICINE

## 2022-09-28 PROCEDURE — 30903 CONTROL OF NOSEBLEED: CPT | Mod: LT

## 2022-09-28 RX ADMIN — PHENYLEPHRINE HYDROCHLORIDE 1 DROP: 0.5 SPRAY NASAL at 05:12

## 2022-09-28 ASSESSMENT — ENCOUNTER SYMPTOMS
NAUSEA: 1
COUGH: 0
LIGHT-HEADEDNESS: 0

## 2022-09-28 NOTE — ED TRIAGE NOTES
Pt reports onset of epistaxis about an hour ago. Pt is on Plavix due to hx of previous strokes. Pt reports this is the first time this has happened to her.

## 2022-09-28 NOTE — ED PROVIDER NOTES
EMERGENCY DEPARTMENT ENCOUNTER      NAME: Anna Neal  AGE: 77 year old female  YOB: 1945  MRN: [id number]  EVALUATION DATE & TIME: No admission date for patient encounter.    PCP: No Ref-Primary, Physician    ED PROVIDER: Karon Dorantes MD      Chief Complaint   Patient presents with     Epistaxis         FINAL IMPRESSION:  1. Epistaxis          ED COURSE & MEDICAL DECISION MAKING:    Pertinent Labs & Imaging studies reviewed. (See chart for details)  77 year old female presents to the Emergency Department for evaluation of nosebleed.  She reports that it started about an hour ago.  She reports that she tried to put pressure on it but it was not stopping.  It started coming out of her right side of her nose and also out of her eyes.  She is on Plavix, otherwise is not anticoagulated.  She has never had a similar nosebleed.  She has had gagging and nauseated associated with it.  She denies any lightheadedness and dizziness.  On exam, she is having brisk bleeding from the left nares and the right nares.  After blowing out the clots, an anterior Rhino Rocket was placed.  This did stop the bleeding.  Patient was comfortable with discharge home with follow-up with ENT as an outpatient.    5:07 AM I met with patient for initial encounter.  5:28 AM I checked on patient. She is no longer bleeding.    At the conclusion of the encounter I discussed the results of all of the tests and the disposition. The questions were answered. The patient or family acknowledged understanding and was agreeable with the care plan.         MEDICATIONS GIVEN IN THE EMERGENCY:  Medications   phenylephrine (SALVADOR-SYNEPHRINE) 0.5 % spray 1 drop (1 drop Both Nostrils Given by Other 9/28/22 0512)       NEW PRESCRIPTIONS STARTED AT TODAY'S ER VISIT  Discharge Medication List as of 9/28/2022  5:48 AM             =================================================================    HPI    Patient information was obtained from:  Patient    Use of : N/A         Anna Neal is a 77 year old female with a pertinent history of cancer who presents to this ED via walk-in for evaluation of epistaxis.    Patient endorses an ongoing nosebleed for the past 1 hour. She states the bleed began in her left nostril but is now in both, she is also gagging and feels nauseas from the blood going down her throat. She denies any history of similar symptoms. Patient takes Plavix, is not on any blood thinners.     Patient denies cough, congestion, light headedness and all other relevant symptoms.      REVIEW OF SYSTEMS   Review of Systems   HENT: Positive for nosebleeds. Negative for congestion.    Respiratory: Negative for cough.    Gastrointestinal: Positive for nausea.   Neurological: Negative for light-headedness.   All other systems reviewed and are negative.       PAST MEDICAL HISTORY:  Past Medical History:   Diagnosis Date     Arthritis      Cancer (H)     basil and squamous skin cancer.  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      Lymphocytic colitis      Restless leg syndrome        PAST SURGICAL HISTORY:  Past Surgical History:   Procedure Laterality Date     APPENDECTOMY       APPENDECTOMY       BREAST SURGERY      tumor right breast removed     DILATION AND CURETTAGE, DIAGNOSTIC / THERAPEUTIC       GYN SURGERY      hysterectomy      KNEE SURGERY       ORTHOPEDIC SURGERY      arthroscopy bilateral knees     SEPTOPLASTY       SOFT TISSUE SURGERY Left     left arm sarcoma removed     ZZC TOTAL KNEE ARTHROPLASTY Left 3/19/2019    Procedure: LEFT TOTAL KNEE ARTHROPLASTY;  Surgeon: Isra Dove MD;  Location: [facility];  Service: Orthopedics           CURRENT MEDICATIONS:    carbidopa-levodopa (SINEMET CR)  MG CR tablet  cholecalciferol 50 MCG (2000 UT) tablet  estradiol (ESTRACE) 0.1 MG/GM vaginal cream  famotidine (PEPCID) 20 MG tablet  fexofenadine (ALLEGRA) 180 MG tablet  metoprolol succinate ER (TOPROL-XL) 25 MG 24 hr  "tablet  rOPINIRole (REQUIP) 1 MG tablet        ALLERGIES:  Allergies   Allergen Reactions     Aspirin Hives     Bee Venom Hives and Itching     Mesalamine Rash     Nsaids Anaphylaxis, Hives and Swelling     Nuts Shortness Of Breath, Swelling and Anaphylaxis     Tree nuts      Sulfamethoxazole-Trimethoprim Hives     Atenolol Other (See Comments)     Fatigue;  12-10;     Cetirizine Other (See Comments)       Other reaction(s): Insomnia         Compazine [Prochlorperazine] Other (See Comments)     Received on 1/31/2022. Pt reports sudden onset knee jerking, concerned that this exacerbated her neurological condition with her legs.     Erythromycin Nausea and GI Disturbance     GI Sx  Other reaction(s): Gastrointestinal, GI intolerance           Gabapentin        Other reaction(s): Mental Status Change  Panic attack         Mometasone Other (See Comments) and Unknown         epistaxis       Nortriptyline Unknown       Other reaction(s): Sedation         Paroxetine Other (See Comments) and Fatigue     tired       Penicillins Swelling and Unknown     unknown  Local swelling with injection at age 16       Pramipexole        Other reaction(s): Mental Status Change, Taste, Changes  Wired, over-alert, was in turbo mode, walking around, could not sit still.            Trazodone Other (See Comments)     VERY TIRED  RLS;  JUMPED OUT OF MY SKIN;         Estradiol Itching and Rash     Latex Rash     unknown         FAMILY HISTORY:  No family history on file.    SOCIAL HISTORY:   Social History     Socioeconomic History     Marital status:    Tobacco Use     Smoking status: Former Smoker     Packs/day: 0.50     Types: Cigars     Smokeless tobacco: Never Used   Vaping Use     Vaping Use: Never used   Substance and Sexual Activity     Alcohol use: No     Drug use: No       VITALS:  /71   Pulse 90   Temp 98.4  F (36.9  C) (Temporal)   Resp 16   Ht 1.626 m (5' 4\")   Wt 74.8 kg (165 lb)   SpO2 96%   BMI 28.32 kg/m  "     PHYSICAL EXAM    Constitutional: Well developed, Well nourished, NAD  HENT: Normocephalic, Atraumatic, Bilateral external ears normal, mucous membranes moist. Brisk bleed from left nares and right nares and bloody tears bilaterally, also noted blood in the posterior  oropharynx.  Neck- Normal range of motion, No tenderness, Supple, No stridor.  Eyes: PERRL, EOMI, Conjunctiva normal, No discharge.   Respiratory: Normal breath sounds, No respiratory distress  Cardiovascular: Normal heart rate, Regular rhythm  GI: Bowel sounds normal, Soft, No tenderness,   Musculoskeletal: No edema. Good range of motion in all major joints. No tenderness to palpation or major deformities noted.   Integument: Warm, Dry, No erythema, No rash  Neurologic: Alert & oriented x 3, Normal motor function, Normal sensory function, No focal deficits noted. Normal gait.   Psychiatric: Affect normal, Judgment normal, Mood normal.          PROCEDURES:     PROCEDURE: Epistaxis Management   INDICATIONS: Failure of epistaxis control with non-invasive management techniques.   PROCEDURE PROVIDER: Dr Karon Dorantes   SITE: Left, Anterior   MEDICATION: None   NOTE: Anterior Source:  The area was evaluated and cleared with nasal suction to locate source of bleeding. The bleeding location was managed with Rapid Rhino (inflatable nasal tamponade device).  Following treatment the patient was observed and no significant bleeding was noted to recur.           COMPLICATIONS: Patient tolerated procedure well, without complication          I, Rob Oliva, am serving as a scribe to document services personally performed by Karon Dorantes, based on my observation and the provider's statements to me. I, Karon Dorantes MD, attest that Rob Oliva is acting in a scribe capacity, has observed my performance of the services and has documented them in accordance with my direction.    Karon Dorantes MD  Emergency Medicine  [facility]  [facility] EMERGENCY DEPARTMENT  [address] [place] [id number]  [phone]      Karon Dorantes MD  09/28/22 0678

## 2022-10-30 ENCOUNTER — HEALTH MAINTENANCE LETTER (OUTPATIENT)
Age: 77
End: 2022-10-30

## 2023-11-18 ENCOUNTER — HEALTH MAINTENANCE LETTER (OUTPATIENT)
Age: 78
End: 2023-11-18

## 2024-03-16 ENCOUNTER — HOSPITAL ENCOUNTER (EMERGENCY)
Facility: CLINIC | Age: 79
Discharge: LEFT BEFORE DISCHARGE | End: 2024-03-16
Admitting: EMERGENCY MEDICINE
Payer: MEDICARE

## 2024-03-16 VITALS
RESPIRATION RATE: 18 BRPM | DIASTOLIC BLOOD PRESSURE: 91 MMHG | OXYGEN SATURATION: 96 % | HEART RATE: 107 BPM | TEMPERATURE: 97.3 F | BODY MASS INDEX: 28.32 KG/M2 | SYSTOLIC BLOOD PRESSURE: 142 MMHG | WEIGHT: 165 LBS

## 2024-03-16 PROCEDURE — 99281 EMR DPT VST MAYX REQ PHY/QHP: CPT

## 2024-03-16 ASSESSMENT — COLUMBIA-SUICIDE SEVERITY RATING SCALE - C-SSRS
1. IN THE PAST MONTH, HAVE YOU WISHED YOU WERE DEAD OR WISHED YOU COULD GO TO SLEEP AND NOT WAKE UP?: NO
2. HAVE YOU ACTUALLY HAD ANY THOUGHTS OF KILLING YOURSELF IN THE PAST MONTH?: NO
6. HAVE YOU EVER DONE ANYTHING, STARTED TO DO ANYTHING, OR PREPARED TO DO ANYTHING TO END YOUR LIFE?: NO

## 2024-03-16 ASSESSMENT — ACTIVITIES OF DAILY LIVING (ADL): ADLS_ACUITY_SCORE: 33

## 2024-03-17 NOTE — ED TRIAGE NOTES
Pt states while eating pizza started having bleeding from left nostril states has had same in past and had to get cauterized 6 times by ENT. Pt is on plavix, states on way here passed big clot and bleeding has slowed, bleeding controlled in triage, nasal clamp placed.

## 2024-03-31 ENCOUNTER — HEALTH MAINTENANCE LETTER (OUTPATIENT)
Age: 79
End: 2024-03-31

## 2025-04-13 ENCOUNTER — HEALTH MAINTENANCE LETTER (OUTPATIENT)
Age: 80
End: 2025-04-13